# Patient Record
Sex: FEMALE | Race: WHITE | Employment: OTHER | URBAN - NONMETROPOLITAN AREA
[De-identification: names, ages, dates, MRNs, and addresses within clinical notes are randomized per-mention and may not be internally consistent; named-entity substitution may affect disease eponyms.]

---

## 2017-10-10 ENCOUNTER — HOSPITAL ENCOUNTER (OUTPATIENT)
Dept: NON INVASIVE DIAGNOSTICS | Age: 67
Discharge: HOME | End: 2017-10-10
Payer: MEDICARE

## 2017-10-10 VITALS — BODY MASS INDEX: 33.38 KG/M2 | HEIGHT: 60 IN | WEIGHT: 170 LBS

## 2017-10-10 PROCEDURE — 3430000000 HC RX DIAGNOSTIC RADIOPHARMACEUTICAL: Performed by: INTERNAL MEDICINE

## 2017-10-10 PROCEDURE — A9500 TC99M SESTAMIBI: HCPCS | Performed by: INTERNAL MEDICINE

## 2017-10-10 PROCEDURE — 93017 CV STRESS TEST TRACING ONLY: CPT | Performed by: INTERNAL MEDICINE

## 2017-10-10 PROCEDURE — 78452 HT MUSCLE IMAGE SPECT MULT: CPT

## 2017-10-10 RX ORDER — MONTELUKAST SODIUM 10 MG/1
10 TABLET ORAL NIGHTLY
COMMUNITY

## 2017-10-10 RX ADMIN — Medication 29.7 MILLICURIE: at 13:00

## 2017-10-10 RX ADMIN — Medication 9 MILLICURIE: at 11:57

## 2018-05-22 ENCOUNTER — OFFICE VISIT (OUTPATIENT)
Dept: ENT CLINIC | Age: 68
End: 2018-05-22
Payer: MEDICARE

## 2018-05-22 VITALS
HEIGHT: 60 IN | DIASTOLIC BLOOD PRESSURE: 70 MMHG | SYSTOLIC BLOOD PRESSURE: 124 MMHG | BODY MASS INDEX: 33.96 KG/M2 | WEIGHT: 173 LBS | TEMPERATURE: 97.7 F | RESPIRATION RATE: 12 BRPM | HEART RATE: 80 BPM

## 2018-05-22 DIAGNOSIS — K21.9 GASTROESOPHAGEAL REFLUX DISEASE WITHOUT ESOPHAGITIS: ICD-10-CM

## 2018-05-22 DIAGNOSIS — H92.01 RIGHT EAR PAIN: ICD-10-CM

## 2018-05-22 DIAGNOSIS — J32.2 CHRONIC ETHMOIDAL SINUSITIS: ICD-10-CM

## 2018-05-22 DIAGNOSIS — H91.93 BILATERAL HEARING LOSS, UNSPECIFIED HEARING LOSS TYPE: Primary | ICD-10-CM

## 2018-05-22 PROCEDURE — G8427 DOCREV CUR MEDS BY ELIG CLIN: HCPCS | Performed by: OTOLARYNGOLOGY

## 2018-05-22 PROCEDURE — 1090F PRES/ABSN URINE INCON ASSESS: CPT | Performed by: OTOLARYNGOLOGY

## 2018-05-22 PROCEDURE — 1036F TOBACCO NON-USER: CPT | Performed by: OTOLARYNGOLOGY

## 2018-05-22 PROCEDURE — 4040F PNEUMOC VAC/ADMIN/RCVD: CPT | Performed by: OTOLARYNGOLOGY

## 2018-05-22 PROCEDURE — 3017F COLORECTAL CA SCREEN DOC REV: CPT | Performed by: OTOLARYNGOLOGY

## 2018-05-22 PROCEDURE — G8400 PT W/DXA NO RESULTS DOC: HCPCS | Performed by: OTOLARYNGOLOGY

## 2018-05-22 PROCEDURE — 1123F ACP DISCUSS/DSCN MKR DOCD: CPT | Performed by: OTOLARYNGOLOGY

## 2018-05-22 PROCEDURE — 99203 OFFICE O/P NEW LOW 30 MIN: CPT | Performed by: OTOLARYNGOLOGY

## 2018-05-22 PROCEDURE — G8417 CALC BMI ABV UP PARAM F/U: HCPCS | Performed by: OTOLARYNGOLOGY

## 2018-05-22 PROCEDURE — 92504 EAR MICROSCOPY EXAMINATION: CPT | Performed by: OTOLARYNGOLOGY

## 2018-05-22 ASSESSMENT — ENCOUNTER SYMPTOMS
RHINORRHEA: 0
APNEA: 0
CHEST TIGHTNESS: 0
VOMITING: 0
TROUBLE SWALLOWING: 0
SORE THROAT: 0
FACIAL SWELLING: 0
WHEEZING: 1
NAUSEA: 0
VOICE CHANGE: 0
SINUS PRESSURE: 0
DIARRHEA: 0
CHOKING: 0
SHORTNESS OF BREATH: 1
COLOR CHANGE: 0
COUGH: 0
STRIDOR: 0
ABDOMINAL PAIN: 0

## 2018-06-26 ENCOUNTER — OFFICE VISIT (OUTPATIENT)
Dept: ENT CLINIC | Age: 68
End: 2018-06-26
Payer: MEDICARE

## 2018-06-26 ENCOUNTER — HOSPITAL ENCOUNTER (OUTPATIENT)
Dept: AUDIOLOGY | Age: 68
Discharge: HOME | End: 2018-06-26
Payer: MEDICARE

## 2018-06-26 VITALS
TEMPERATURE: 97.5 F | HEART RATE: 76 BPM | DIASTOLIC BLOOD PRESSURE: 70 MMHG | WEIGHT: 174 LBS | HEIGHT: 60 IN | BODY MASS INDEX: 34.16 KG/M2 | RESPIRATION RATE: 12 BRPM | SYSTOLIC BLOOD PRESSURE: 130 MMHG

## 2018-06-26 DIAGNOSIS — H90.3 SENSORINEURAL HEARING LOSS (SNHL) OF BOTH EARS: Primary | ICD-10-CM

## 2018-06-26 DIAGNOSIS — K21.9 GASTROESOPHAGEAL REFLUX DISEASE WITHOUT ESOPHAGITIS: ICD-10-CM

## 2018-06-26 PROCEDURE — 1101F PT FALLS ASSESS-DOCD LE1/YR: CPT | Performed by: OTOLARYNGOLOGY

## 2018-06-26 PROCEDURE — 92567 TYMPANOMETRY: CPT | Performed by: AUDIOLOGIST

## 2018-06-26 PROCEDURE — 4040F PNEUMOC VAC/ADMIN/RCVD: CPT | Performed by: OTOLARYNGOLOGY

## 2018-06-26 PROCEDURE — G8427 DOCREV CUR MEDS BY ELIG CLIN: HCPCS | Performed by: OTOLARYNGOLOGY

## 2018-06-26 PROCEDURE — 1123F ACP DISCUSS/DSCN MKR DOCD: CPT | Performed by: OTOLARYNGOLOGY

## 2018-06-26 PROCEDURE — 99213 OFFICE O/P EST LOW 20 MIN: CPT | Performed by: OTOLARYNGOLOGY

## 2018-06-26 PROCEDURE — 92557 COMPREHENSIVE HEARING TEST: CPT | Performed by: AUDIOLOGIST

## 2018-06-26 PROCEDURE — G8417 CALC BMI ABV UP PARAM F/U: HCPCS | Performed by: OTOLARYNGOLOGY

## 2018-06-26 PROCEDURE — 3017F COLORECTAL CA SCREEN DOC REV: CPT | Performed by: OTOLARYNGOLOGY

## 2018-06-26 PROCEDURE — 1036F TOBACCO NON-USER: CPT | Performed by: OTOLARYNGOLOGY

## 2018-06-26 PROCEDURE — G8400 PT W/DXA NO RESULTS DOC: HCPCS | Performed by: OTOLARYNGOLOGY

## 2018-06-26 PROCEDURE — 1090F PRES/ABSN URINE INCON ASSESS: CPT | Performed by: OTOLARYNGOLOGY

## 2018-06-26 ASSESSMENT — ENCOUNTER SYMPTOMS
SINUS PRESSURE: 0
FACIAL SWELLING: 0
ABDOMINAL PAIN: 0
RHINORRHEA: 0
STRIDOR: 0
APNEA: 0
VOMITING: 0
COUGH: 0
VOICE CHANGE: 0
NAUSEA: 0
WHEEZING: 0
TROUBLE SWALLOWING: 0
DIARRHEA: 0
CHEST TIGHTNESS: 0
COLOR CHANGE: 0
SHORTNESS OF BREATH: 0
SORE THROAT: 0
CHOKING: 0

## 2018-06-26 NOTE — PROGRESS NOTES
hydrochlorothiazide (HYDRODIURIL) 12.5 MG tablet Take 12.5 mg by mouth daily.  rosuvastatin (CRESTOR) 10 MG tablet Take 10 mg by mouth daily.  Multiple Vitamins-Minerals (CENTRUM SILVER) TABS Take  by mouth daily.  albuterol (PROAIR HFA) 108 (90 BASE) MCG/ACT inhaler Inhale 2 puffs into the lungs every 6 hours as needed. No current facility-administered medications for this visit. Past Medical History:   Diagnosis Date    Allergic rhinitis     Asthma     Hyperlipidemia     Hypertension     PONV (postoperative nausea and vomiting)     patch helps      Past Surgical History:   Procedure Laterality Date    ADENOIDECTOMY      BLADDER SUSPENSION  1984    BREAST SURGERY  july 2010    right- 2 cells benign     CHOLECYSTECTOMY  10/20/2011    COLONOSCOPY      HYSTERECTOMY  1984    total    TONSILLECTOMY AND ADENOIDECTOMY      UPPER GASTROINTESTINAL ENDOSCOPY       Family History   Problem Relation Age of Onset    High Blood Pressure Mother     Diabetes Mother     High Blood Pressure Father     Diabetes Father     Macular Degen Paternal Grandfather     Diabetes Sister     Cancer Brother     Diabetes Brother     Heart Disease Brother     High Blood Pressure Brother     Cancer Brother     Diabetes Brother     High Blood Pressure Brother     High Blood Pressure Brother     Stroke Neg Hx      Social History   Substance Use Topics    Smoking status: Former Smoker     Quit date: 6/29/2001    Smokeless tobacco: Never Used    Alcohol use No       Subjective:      Review of Systems   Constitutional: Negative for activity change, appetite change, chills, diaphoresis, fatigue, fever and unexpected weight change. HENT: Negative for congestion, dental problem, ear discharge, ear pain, facial swelling, hearing loss, mouth sores, nosebleeds, postnasal drip, rhinorrhea, sinus pressure, sneezing, sore throat, tinnitus, trouble swallowing and voice change.     Eyes: Negative for

## 2019-05-14 ENCOUNTER — HOSPITAL ENCOUNTER (EMERGENCY)
Age: 69
Discharge: HOME | End: 2019-05-14
Attending: EMERGENCY MEDICINE
Payer: MEDICARE

## 2019-05-14 VITALS
DIASTOLIC BLOOD PRESSURE: 62 MMHG | SYSTOLIC BLOOD PRESSURE: 131 MMHG | RESPIRATION RATE: 20 BRPM | OXYGEN SATURATION: 94 % | TEMPERATURE: 98.4 F | BODY MASS INDEX: 32.77 KG/M2 | HEART RATE: 78 BPM | WEIGHT: 165 LBS

## 2019-05-14 DIAGNOSIS — J01.01 ACUTE RECURRENT MAXILLARY SINUSITIS: Primary | ICD-10-CM

## 2019-05-14 DIAGNOSIS — J45.41 MODERATE PERSISTENT ASTHMA WITH ACUTE EXACERBATION: ICD-10-CM

## 2019-05-14 DIAGNOSIS — J20.9 ACUTE BRONCHITIS DUE TO INFECTION: ICD-10-CM

## 2019-05-14 PROCEDURE — 99214 OFFICE O/P EST MOD 30 MIN: CPT | Performed by: EMERGENCY MEDICINE

## 2019-05-14 PROCEDURE — 99212 OFFICE O/P EST SF 10 MIN: CPT

## 2019-05-14 RX ORDER — PREDNISONE 20 MG/1
20 TABLET ORAL DAILY
Qty: 7 TABLET | Refills: 0 | Status: SHIPPED | OUTPATIENT
Start: 2019-05-14 | End: 2019-05-21

## 2019-05-14 RX ORDER — AMOXICILLIN AND CLAVULANATE POTASSIUM 875; 125 MG/1; MG/1
1 TABLET, FILM COATED ORAL 2 TIMES DAILY
Qty: 14 TABLET | Refills: 0 | Status: SHIPPED | OUTPATIENT
Start: 2019-05-14 | End: 2019-05-21

## 2019-05-14 RX ORDER — BENZONATATE 200 MG/1
200 CAPSULE ORAL 3 TIMES DAILY PRN
Qty: 10 CAPSULE | Refills: 0 | Status: SHIPPED | OUTPATIENT
Start: 2019-05-14 | End: 2019-05-29

## 2019-05-14 ASSESSMENT — ENCOUNTER SYMPTOMS
CHOKING: 0
CONSTIPATION: 0
EYE PAIN: 0
NAUSEA: 0
EYE DISCHARGE: 0
STRIDOR: 0
EYE REDNESS: 0
ABDOMINAL PAIN: 0
COUGH: 1
VOICE CHANGE: 0
SINUS PAIN: 1
SINUS PRESSURE: 1
SHORTNESS OF BREATH: 0
DIARRHEA: 0
SORE THROAT: 1
TROUBLE SWALLOWING: 0
BLOOD IN STOOL: 0
BACK PAIN: 0
WHEEZING: 1
RHINORRHEA: 1
VOMITING: 0
FACIAL SWELLING: 0

## 2019-05-14 ASSESSMENT — PAIN DESCRIPTION - DESCRIPTORS: DESCRIPTORS: ACHING

## 2019-05-14 ASSESSMENT — PAIN SCALES - GENERAL: PAINLEVEL_OUTOF10: 5

## 2019-05-14 ASSESSMENT — PAIN DESCRIPTION - LOCATION: LOCATION: HEAD

## 2019-05-14 ASSESSMENT — PAIN DESCRIPTION - PAIN TYPE: TYPE: ACUTE PAIN

## 2019-05-14 ASSESSMENT — PAIN DESCRIPTION - FREQUENCY: FREQUENCY: CONTINUOUS

## 2019-05-14 NOTE — ED TRIAGE NOTES
Pt walked to room 3. Pt here with complaints of a productive cough, chest tightness, nasal drainage--green, shortness of breath. Started 3 days ago.

## 2019-05-14 NOTE — ED PROVIDER NOTES
[address]       Chief Complaint   Patient presents with    Cough     Productive cough, green nasal drainage, headache. Started 3 days ago. Nurses Notes reviewed and I agree except as noted in the HPI. HISTORY OF PRESENT ILLNESS   Vipul Gee is a 70 y.o. female who presents with three-day history of maxillary sinus pain and pressure, cough productive of yellow sputum, fatigue, sore throat, malaise, wheezing. Patient states symptoms are identical to previous bronchitis and sinusitis. No chest pain, stridor, abdominal pain, vomiting, hemoptysis, leg pain or leg swelling, rash,  symptoms, diarrhea. History of asthma no DM, CHF or PE.    REVIEW OF SYSTEMS     Review of Systems   Constitutional: Positive for appetite change. Negative for chills, fatigue, fever and unexpected weight change. HENT: Positive for congestion, postnasal drip, rhinorrhea, sinus pressure, sinus pain and sore throat. Negative for ear discharge, ear pain, facial swelling, hearing loss, nosebleeds, trouble swallowing and voice change. Eyes: Negative for pain, discharge, redness and visual disturbance. Respiratory: Positive for cough and wheezing. Negative for choking, shortness of breath and stridor. Cardiovascular: Negative for chest pain and leg swelling. Gastrointestinal: Negative for abdominal pain, blood in stool, constipation, diarrhea, nausea and vomiting. Genitourinary: Negative for dysuria, flank pain, frequency, hematuria, urgency, vaginal bleeding and vaginal discharge. Musculoskeletal: Negative for arthralgias, back pain, neck pain and neck stiffness. Skin: Negative for rash. Neurological: Negative for dizziness, seizures, syncope, weakness, light-headedness and headaches. Hematological: Negative for adenopathy. Does not bruise/bleed easily. Psychiatric/Behavioral: Negative for confusion, sleep disturbance and suicidal ideas.  The patient is not nervous/anxious. All other systems reviewed and are negative. PAST MEDICAL HISTORY         Diagnosis Date    Allergic rhinitis     Asthma     Hyperlipidemia     Hypertension     PONV (postoperative nausea and vomiting)     patch helps       SURGICAL HISTORY     Patient  has a past surgical history that includes Cholecystectomy (10/20/2011); bladder suspension (1984); Hysterectomy (1984); Tonsillectomy and adenoidectomy; Adenoidectomy; Breast surgery (july 2010); Colonoscopy; and Upper gastrointestinal endoscopy. CURRENT MEDICATIONS       Previous Medications    ALBUTEROL (PROAIR HFA) 108 (90 BASE) MCG/ACT INHALER    Inhale 2 puffs into the lungs every 6 hours as needed. ATENOLOL (TENORMIN) 50 MG TABLET    Take 50 mg by mouth daily. CETIRIZINE (ZYRTEC) 10 MG TABLET    Take 10 mg by mouth daily    DEXLANSOPRAZOLE (DEXILANT) 60 MG CPDR DELAYED RELEASE CAPSULE    Take 60 mg by mouth as needed    HYDROCHLOROTHIAZIDE (HYDRODIURIL) 12.5 MG TABLET    Take 12.5 mg by mouth daily. MOMETASONE-FORMOTEROL (DULERA) 100-5 MCG/ACT INHALER    Inhale 2 puffs into the lungs 2 times daily    MONTELUKAST (SINGULAIR) 10 MG TABLET    Take 10 mg by mouth nightly    MULTIPLE VITAMINS-MINERALS (CENTRUM SILVER) TABS    Take  by mouth daily. ROSUVASTATIN (CRESTOR) 10 MG TABLET    Take 10 mg by mouth daily. TRIAMCINOLONE ACETONIDE (NASACORT AQ NA)    by Nasal route as needed       ALLERGIES     Patient is has No Known Allergies. FAMILY HISTORY     Patient'sfamily history includes Cancer in her brother and brother; Diabetes in her brother, brother, father, mother, and sister; Heart Disease in her brother; High Blood Pressure in her brother, brother, brother, father, and mother; [place] in her paternal grandfather. SOCIAL HISTORY     Patient  reports that she quit smoking about 17 years ago.  She has never used smokeless tobacco. She reports that she does not drink alcohol or use drugs.    PHYSICAL EXAM     ED TRIAGE VITALS  BP: 131/62, Temp: 98.4 °F (36.9 °C), Pulse: 78, Resp: 20, SpO2: 94 %  Physical Exam   Constitutional: She is oriented to person, place, and time. She appears well-developed and well-nourished. No distress. Dry cough, moist membranes, normal airway   HENT:   Head: Normocephalic and atraumatic. Right Ear: Tympanic membrane and external ear normal.   Left Ear: Tympanic membrane and external ear normal.   Nose: No rhinorrhea. Right sinus exhibits maxillary sinus tenderness and frontal sinus tenderness. Left sinus exhibits maxillary sinus tenderness and frontal sinus tenderness. Mouth/Throat: No trismus in the jaw. No uvula swelling. Posterior oropharyngeal erythema present. No oropharyngeal exudate, posterior oropharyngeal edema or tonsillar abscesses. Eyes: Pupils are equal, round, and reactive to light. Conjunctivae and EOM are normal. Right eye exhibits no discharge. Left eye exhibits no discharge. No scleral icterus. Neck: Normal range of motion. No JVD present. No thyromegaly present. No meningismus   Cardiovascular: Normal rate, regular rhythm, S1 normal, S2 normal, normal heart sounds, intact distal pulses and normal pulses. Exam reveals no gallop and no friction rub. No murmur heard. Pulmonary/Chest: Effort normal. No stridor. No tachypnea. No respiratory distress. She has no decreased breath sounds. She has no wheezes. She has rhonchi. She has no rales. She exhibits no tenderness. Dry cough, diffuse rhonchi, no wheezing   Abdominal: Soft. Bowel sounds are normal. She exhibits no distension and no mass. There is no tenderness. There is no rebound and no guarding. Soft nontender   Musculoskeletal: Normal range of motion. She exhibits no edema or tenderness. Right lower leg: Normal.        Left lower leg: Normal.   Lymphadenopathy:     She has cervical adenopathy. Right cervical: Superficial cervical adenopathy present.  No deep cervical and no posterior cervical adenopathy present. Left cervical: Superficial cervical adenopathy present. No deep cervical and no posterior cervical adenopathy present. Neurological: She is alert and oriented to person, place, and time. She has normal reflexes. She displays normal reflexes. No cranial nerve deficit. She exhibits normal muscle tone. Coordination normal.   Appropriate, no focal findings   Skin: Skin is warm and dry. No rash noted. She is not diaphoretic. No erythema. No rash   Psychiatric: She has a normal mood and affect. Her behavior is normal. Judgment and thought content normal.   Nursing note and vitals reviewed. DIAGNOSTIC RESULTS   Labs: No results found for this visit on 05/14/19. IMAGING:  No orders to display     URGENT CARE COURSE:     Vitals:    05/14/19 1909   BP: 131/62   Pulse: 78   Resp: 20   Temp: 98.4 °F (36.9 °C)   TempSrc: Temporal   SpO2: 94%   Weight: 165 lb (74.8 kg)       Medications - No data to display  PROCEDURES:  None  FINALIMPRESSION      1. Acute recurrent maxillary sinusitis    2. Acute bronchitis due to infection    3. Moderate persistent asthma with acute exacerbation        DISPOSITION/PLAN   DISPOSITION Decision To Discharge 05/14/2019 07:47:09 PM  Nontoxic, well-hydrated, normal airway. No airway abscess or epiglottitis, sepsis, CNS infection, pneumonia, hypoxia, bronchospasm. No ACS, CHF, PE. Patient has acute sinusitis and bronchitis. She has exacerbation of asthma. Will treat with Augmentin, prednisone, albuterol, Tessalon Perles, Tylenol, increased oral clear liquids, rest.  Patient to recheck with PCP in 3 days if problems persist, and she understands to go to ED if worse.   PATIENT REFERRED TO:  Tammy Mathis MD  [place] [id number]  [phone]    Schedule an appointment as soon as possible for a visit in 3 days  Recheck if problems persist, go to emergency if worse    DISCHARGE MEDICATIONS:  New Prescriptions AMOXICILLIN-CLAVULANATE (AUGMENTIN) 875-125 MG PER TABLET    Take 1 tablet by mouth 2 times daily for 7 days    BENZONATATE (TESSALON) 200 MG CAPSULE    Take 1 capsule by mouth 3 times daily as needed for Cough    PREDNISONE (DELTASONE) 20 MG TABLET    Take 1 tablet by mouth daily for 7 days     Current Discharge Medication List          MD Christopher Varela MD  05/14/19 [address], [place]  05/15/19 4146

## 2019-05-15 ASSESSMENT — ENCOUNTER SYMPTOMS
VOMITING: 0
SINUS PRESSURE: 1
FACIAL SWELLING: 0
CHOKING: 0
CONSTIPATION: 0
SHORTNESS OF BREATH: 0
NAUSEA: 0
BLOOD IN STOOL: 0
EYE DISCHARGE: 0
TROUBLE SWALLOWING: 0
STRIDOR: 0
VOICE CHANGE: 0
DIARRHEA: 0
EYE REDNESS: 0
SORE THROAT: 1
EYE PAIN: 0
BACK PAIN: 0
ABDOMINAL PAIN: 0
COUGH: 1
WHEEZING: 1
RHINORRHEA: 1
SINUS PAIN: 1

## 2019-12-19 ENCOUNTER — HOSPITAL ENCOUNTER (EMERGENCY)
Age: 69
Discharge: HOME | End: 2019-12-19
Payer: MEDICARE

## 2019-12-19 ENCOUNTER — APPOINTMENT (OUTPATIENT)
Dept: GENERAL RADIOLOGY | Age: 69
End: 2019-12-19
Payer: MEDICARE

## 2019-12-19 ENCOUNTER — APPOINTMENT (OUTPATIENT)
Dept: CT IMAGING | Age: 69
End: 2019-12-19
Payer: MEDICARE

## 2019-12-19 ENCOUNTER — APPOINTMENT (OUTPATIENT)
Dept: MRI IMAGING | Age: 69
End: 2019-12-19
Payer: MEDICARE

## 2019-12-19 VITALS
HEART RATE: 77 BPM | OXYGEN SATURATION: 98 % | RESPIRATION RATE: 16 BRPM | SYSTOLIC BLOOD PRESSURE: 167 MMHG | TEMPERATURE: 98.3 F | DIASTOLIC BLOOD PRESSURE: 95 MMHG

## 2019-12-19 DIAGNOSIS — S63.681A OTHER SPRAIN OF RIGHT THUMB, INITIAL ENCOUNTER: ICD-10-CM

## 2019-12-19 DIAGNOSIS — S01.01XA LACERATION OF SCALP, INITIAL ENCOUNTER: ICD-10-CM

## 2019-12-19 DIAGNOSIS — S29.012A STRAIN OF THORACIC BACK REGION: ICD-10-CM

## 2019-12-19 DIAGNOSIS — S09.90XA CLOSED HEAD INJURY, INITIAL ENCOUNTER: Primary | ICD-10-CM

## 2019-12-19 PROCEDURE — 90471 IMMUNIZATION ADMIN: CPT | Performed by: EMERGENCY MEDICINE

## 2019-12-19 PROCEDURE — 6360000004 HC RX CONTRAST MEDICATION: Performed by: EMERGENCY MEDICINE

## 2019-12-19 PROCEDURE — 6360000002 HC RX W HCPCS: Performed by: EMERGENCY MEDICINE

## 2019-12-19 PROCEDURE — 72125 CT NECK SPINE W/O DYE: CPT

## 2019-12-19 PROCEDURE — 72072 X-RAY EXAM THORAC SPINE 3VWS: CPT

## 2019-12-19 PROCEDURE — 99284 EMERGENCY DEPT VISIT MOD MDM: CPT

## 2019-12-19 PROCEDURE — 12035 INTMD RPR S/A/T/EXT 12.6-20: CPT

## 2019-12-19 PROCEDURE — A9579 GAD-BASE MR CONTRAST NOS,1ML: HCPCS | Performed by: EMERGENCY MEDICINE

## 2019-12-19 PROCEDURE — 2500000003 HC RX 250 WO HCPCS: Performed by: EMERGENCY MEDICINE

## 2019-12-19 PROCEDURE — 70450 CT HEAD/BRAIN W/O DYE: CPT

## 2019-12-19 PROCEDURE — 70553 MRI BRAIN STEM W/O & W/DYE: CPT

## 2019-12-19 PROCEDURE — 96374 THER/PROPH/DIAG INJ IV PUSH: CPT

## 2019-12-19 PROCEDURE — 2580000003 HC RX 258: Performed by: EMERGENCY MEDICINE

## 2019-12-19 PROCEDURE — 73130 X-RAY EXAM OF HAND: CPT

## 2019-12-19 PROCEDURE — 90715 TDAP VACCINE 7 YRS/> IM: CPT | Performed by: EMERGENCY MEDICINE

## 2019-12-19 PROCEDURE — 96375 TX/PRO/DX INJ NEW DRUG ADDON: CPT

## 2019-12-19 RX ORDER — ONDANSETRON 2 MG/ML
4 INJECTION INTRAMUSCULAR; INTRAVENOUS ONCE
Status: COMPLETED | OUTPATIENT
Start: 2019-12-19 | End: 2019-12-19

## 2019-12-19 RX ORDER — MORPHINE SULFATE 2 MG/ML
2 INJECTION, SOLUTION INTRAMUSCULAR; INTRAVENOUS ONCE
Status: COMPLETED | OUTPATIENT
Start: 2019-12-19 | End: 2019-12-19

## 2019-12-19 RX ORDER — CEPHALEXIN 500 MG/1
500 CAPSULE ORAL 3 TIMES DAILY
Qty: 30 CAPSULE | Refills: 0 | Status: SHIPPED | OUTPATIENT
Start: 2019-12-19 | End: 2019-12-29

## 2019-12-19 RX ORDER — LIDOCAINE HYDROCHLORIDE AND EPINEPHRINE 10; 10 MG/ML; UG/ML
20 INJECTION, SOLUTION INFILTRATION; PERINEURAL ONCE
Status: COMPLETED | OUTPATIENT
Start: 2019-12-19 | End: 2019-12-19

## 2019-12-19 RX ADMIN — GADOTERIDOL 15 ML: 279.3 INJECTION, SOLUTION INTRAVENOUS at 13:58

## 2019-12-19 RX ADMIN — LIDOCAINE HYDROCHLORIDE AND EPINEPHRINE 20 ML: 10; 10 INJECTION, SOLUTION INFILTRATION; PERINEURAL at 11:00

## 2019-12-19 RX ADMIN — TETANUS TOXOID, REDUCED DIPHTHERIA TOXOID AND ACELLULAR PERTUSSIS VACCINE, ADSORBED 0.5 ML: 5; 2.5; 8; 8; 2.5 SUSPENSION INTRAMUSCULAR at 10:50

## 2019-12-19 RX ADMIN — MORPHINE SULFATE 2 MG: 2 INJECTION, SOLUTION INTRAMUSCULAR; INTRAVENOUS at 12:23

## 2019-12-19 RX ADMIN — ONDANSETRON 4 MG: 2 INJECTION INTRAMUSCULAR; INTRAVENOUS at 12:23

## 2019-12-19 RX ADMIN — CEFAZOLIN 1 G: 1 INJECTION, POWDER, FOR SOLUTION INTRAMUSCULAR; INTRAVENOUS at 14:55

## 2019-12-19 ASSESSMENT — PAIN SCALES - GENERAL
PAINLEVEL_OUTOF10: 0
PAINLEVEL_OUTOF10: 6
PAINLEVEL_OUTOF10: 7

## 2019-12-19 ASSESSMENT — ENCOUNTER SYMPTOMS
COUGH: 0
COLOR CHANGE: 0
SHORTNESS OF BREATH: 0
BACK PAIN: 1

## 2019-12-19 ASSESSMENT — PAIN DESCRIPTION - PAIN TYPE: TYPE: DEEP SOMATIC PAIN

## 2019-12-27 ENCOUNTER — OFFICE VISIT (OUTPATIENT)
Dept: SURGERY | Age: 69
End: 2019-12-27
Payer: MEDICARE

## 2019-12-27 VITALS
SYSTOLIC BLOOD PRESSURE: 124 MMHG | OXYGEN SATURATION: 96 % | RESPIRATION RATE: 20 BRPM | TEMPERATURE: 98 F | WEIGHT: 166 LBS | DIASTOLIC BLOOD PRESSURE: 80 MMHG | HEART RATE: 72 BPM | BODY MASS INDEX: 32.97 KG/M2
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
## 2019-12-27 DIAGNOSIS — S01.01XA SCALP LACERATION, INITIAL ENCOUNTER: ICD-10-CM

## 2019-12-27 PROCEDURE — G8427 DOCREV CUR MEDS BY ELIG CLIN: HCPCS | Performed by: SURGERY

## 2019-12-27 PROCEDURE — 1090F PRES/ABSN URINE INCON ASSESS: CPT | Performed by: SURGERY

## 2019-12-27 PROCEDURE — G8417 CALC BMI ABV UP PARAM F/U: HCPCS | Performed by: SURGERY

## 2019-12-27 PROCEDURE — G8484 FLU IMMUNIZE NO ADMIN: HCPCS | Performed by: SURGERY

## 2019-12-27 PROCEDURE — 99203 OFFICE O/P NEW LOW 30 MIN: CPT | Performed by: SURGERY

## 2019-12-27 RX ORDER — IBUPROFEN 200 MG
200 TABLET ORAL EVERY 6 HOURS PRN
COMMUNITY

## 2019-12-29 ASSESSMENT — ENCOUNTER SYMPTOMS
BLOOD IN STOOL: 0
FACIAL SWELLING: 0
NAUSEA: 0
ABDOMINAL PAIN: 0
WHEEZING: 0
CONSTIPATION: 0
STRIDOR: 0
DIARRHEA: 0
TROUBLE SWALLOWING: 0
SINUS PRESSURE: 0
ANAL BLEEDING: 0
SHORTNESS OF BREATH: 0
RECTAL PAIN: 0
EYE ITCHING: 0
EYE REDNESS: 0
VOMITING: 0
BACK PAIN: 0
APNEA: 0
VOICE CHANGE: 0
CHOKING: 0
EYE PAIN: 0
SORE THROAT: 0
COLOR CHANGE: 0
ALLERGIC/IMMUNOLOGIC NEGATIVE: 1
PHOTOPHOBIA: 0
CHEST TIGHTNESS: 0
RHINORRHEA: 0
EYE DISCHARGE: 0
COUGH: 0
ABDOMINAL DISTENTION: 0

## 2020-02-03 ENCOUNTER — HOSPITAL ENCOUNTER (OUTPATIENT)
Dept: MRI IMAGING | Age: 70
Discharge: HOME | End: 2020-02-03
Payer: MEDICARE

## 2020-02-03 LAB — POC CREATININE WHOLE BLOOD: 0.8 MG/DL (ref 0.5–1.2)

## 2020-02-03 PROCEDURE — 70553 MRI BRAIN STEM W/O & W/DYE: CPT

## 2020-02-03 PROCEDURE — 82565 ASSAY OF CREATININE: CPT

## 2020-02-03 PROCEDURE — A9579 GAD-BASE MR CONTRAST NOS,1ML: HCPCS | Performed by: FAMILY MEDICINE

## 2020-02-03 PROCEDURE — 6360000004 HC RX CONTRAST MEDICATION: Performed by: FAMILY MEDICINE

## 2020-02-03 RX ADMIN — GADOTERIDOL 20 ML: 279.3 INJECTION, SOLUTION INTRAVENOUS at 16:56

## 2020-10-29 LAB — SARS-COV-2: DETECTED

## 2020-11-03 ENCOUNTER — APPOINTMENT (OUTPATIENT)
Dept: GENERAL RADIOLOGY | Age: 70
DRG: 177 | End: 2020-11-03
Payer: MEDICARE

## 2020-11-03 ENCOUNTER — HOSPITAL ENCOUNTER (INPATIENT)
Age: 70
LOS: 3 days | Discharge: HOME | DRG: 177 | End: 2020-11-06
Attending: INTERNAL MEDICINE | Admitting: INTERNAL MEDICINE
Payer: MEDICARE

## 2020-11-03 PROBLEM — U07.1 ACUTE RESPIRATORY FAILURE DUE TO COVID-19 (HCC): Status: ACTIVE | Noted: 2020-11-03

## 2020-11-03 PROBLEM — J96.00 ACUTE RESPIRATORY FAILURE DUE TO COVID-19 (HCC): Status: ACTIVE | Noted: 2020-11-03

## 2020-11-03 LAB
ALBUMIN SERPL-MCNC: 4.1 G/DL (ref 3.5–5.1)
ALP BLD-CCNC: 78 U/L (ref 38–126)
ALT SERPL-CCNC: 55 U/L (ref 11–66)
ANION GAP SERPL CALCULATED.3IONS-SCNC: 14 MEQ/L (ref 8–16)
AST SERPL-CCNC: 71 U/L (ref 5–40)
BASOPHILS # BLD: 0.2 %
BASOPHILS ABSOLUTE: 0 THOU/MM3 (ref 0–0.1)
BILIRUB SERPL-MCNC: 0.7 MG/DL (ref 0.3–1.2)
BUN BLDV-MCNC: 18 MG/DL (ref 7–22)
C-REACTIVE PROTEIN: 5.23 MG/DL (ref 0–1)
CALCIUM SERPL-MCNC: 9.6 MG/DL (ref 8.5–10.5)
CHLORIDE BLD-SCNC: 88 MEQ/L (ref 98–111)
CO2: 29 MEQ/L (ref 23–33)
CREAT SERPL-MCNC: 0.8 MG/DL (ref 0.4–1.2)
D-DIMER QUANTITATIVE: 627 NG/ML FEU (ref 0–500)
EKG ATRIAL RATE: 82 BPM
EKG P AXIS: 61 DEGREES
EKG P-R INTERVAL: 144 MS
EKG Q-T INTERVAL: 370 MS
EKG QRS DURATION: 92 MS
EKG QTC CALCULATION (BAZETT): 432 MS
EKG R AXIS: 68 DEGREES
EKG T AXIS: 56 DEGREES
EKG VENTRICULAR RATE: 82 BPM
EOSINOPHIL # BLD: 0 %
EOSINOPHILS ABSOLUTE: 0 THOU/MM3 (ref 0–0.4)
ERYTHROCYTE [DISTWIDTH] IN BLOOD BY AUTOMATED COUNT: 12.2 % (ref 11.5–14.5)
ERYTHROCYTE [DISTWIDTH] IN BLOOD BY AUTOMATED COUNT: 40.7 FL (ref 35–45)
FERRITIN: 711 NG/ML (ref 10–291)
GFR SERPL CREATININE-BSD FRML MDRD: 71 ML/MIN/1.73M2
GLUCOSE BLD-MCNC: 151 MG/DL (ref 70–108)
HCT VFR BLD CALC: 45.3 % (ref 37–47)
HEMOGLOBIN: 15.8 GM/DL (ref 12–16)
IMMATURE GRANS (ABS): 0.02 THOU/MM3 (ref 0–0.07)
IMMATURE GRANULOCYTES: 0.4 %
LACTIC ACID, SEPSIS: 1.4 MMOL/L (ref 0.5–1.9)
LD: 328 U/L (ref 100–190)
LYMPHOCYTES # BLD: 16 %
LYMPHOCYTES ABSOLUTE: 0.7 THOU/MM3 (ref 1–4.8)
MCH RBC QN AUTO: 31.7 PG (ref 26–33)
MCHC RBC AUTO-ENTMCNC: 34.9 GM/DL (ref 32.2–35.5)
MCV RBC AUTO: 90.8 FL (ref 81–99)
MONOCYTES # BLD: 8.2 %
MONOCYTES ABSOLUTE: 0.4 THOU/MM3 (ref 0.4–1.3)
NUCLEATED RED BLOOD CELLS: 0 /100 WBC
OSMOLALITY CALCULATION: 267.5 MOSMOL/KG (ref 275–300)
PLATELET # BLD: 161 THOU/MM3 (ref 130–400)
PMV BLD AUTO: 10.2 FL (ref 9.4–12.4)
POTASSIUM SERPL-SCNC: 2.9 MEQ/L (ref 3.5–5.2)
PROCALCITONIN: 0.24 NG/ML (ref 0.01–0.09)
RBC # BLD: 4.99 MILL/MM3 (ref 4.2–5.4)
SEG NEUTROPHILS: 75.2 %
SEGMENTED NEUTROPHILS ABSOLUTE COUNT: 3.5 THOU/MM3 (ref 1.8–7.7)
SODIUM BLD-SCNC: 131 MEQ/L (ref 135–145)
TOTAL PROTEIN: 7.4 G/DL (ref 6.1–8)
TROPONIN T: < 0.01 NG/ML
WBC # BLD: 4.6 THOU/MM3 (ref 4.8–10.8)

## 2020-11-03 PROCEDURE — 87040 BLOOD CULTURE FOR BACTERIA: CPT

## 2020-11-03 PROCEDURE — 86901 BLOOD TYPING SEROLOGIC RH(D): CPT

## 2020-11-03 PROCEDURE — 86140 C-REACTIVE PROTEIN: CPT

## 2020-11-03 PROCEDURE — 83605 ASSAY OF LACTIC ACID: CPT

## 2020-11-03 PROCEDURE — 80053 COMPREHEN METABOLIC PANEL: CPT

## 2020-11-03 PROCEDURE — 86078 PHYS BLOOD BANK SERV REACTJ: CPT

## 2020-11-03 PROCEDURE — 1200000003 HC TELEMETRY R&B

## 2020-11-03 PROCEDURE — P9059 PLASMA, FRZ BETWEEN 8-24HOUR: HCPCS

## 2020-11-03 PROCEDURE — XW13325 TRANSFUSION OF CONVALESCENT PLASMA (NONAUTOLOGOUS) INTO PERIPHERAL VEIN, PERCUTANEOUS APPROACH, NEW TECHNOLOGY GROUP 5: ICD-10-PCS | Performed by: FAMILY MEDICINE

## 2020-11-03 PROCEDURE — 86900 BLOOD TYPING SEROLOGIC ABO: CPT

## 2020-11-03 PROCEDURE — 85025 COMPLETE CBC W/AUTO DIFF WBC: CPT

## 2020-11-03 PROCEDURE — 84145 PROCALCITONIN (PCT): CPT

## 2020-11-03 PROCEDURE — 93005 ELECTROCARDIOGRAM TRACING: CPT | Performed by: PHYSICIAN ASSISTANT

## 2020-11-03 PROCEDURE — 6370000000 HC RX 637 (ALT 250 FOR IP): Performed by: PHYSICIAN ASSISTANT

## 2020-11-03 PROCEDURE — 82728 ASSAY OF FERRITIN: CPT

## 2020-11-03 PROCEDURE — 93010 ELECTROCARDIOGRAM REPORT: CPT | Performed by: INTERNAL MEDICINE

## 2020-11-03 PROCEDURE — 99222 1ST HOSP IP/OBS MODERATE 55: CPT | Performed by: PHYSICIAN ASSISTANT

## 2020-11-03 PROCEDURE — 85379 FIBRIN DEGRADATION QUANT: CPT

## 2020-11-03 PROCEDURE — XW033E5 INTRODUCTION OF REMDESIVIR ANTI-INFECTIVE INTO PERIPHERAL VEIN, PERCUTANEOUS APPROACH, NEW TECHNOLOGY GROUP 5: ICD-10-PCS | Performed by: FAMILY MEDICINE

## 2020-11-03 PROCEDURE — 84484 ASSAY OF TROPONIN QUANT: CPT

## 2020-11-03 PROCEDURE — 71045 X-RAY EXAM CHEST 1 VIEW: CPT

## 2020-11-03 PROCEDURE — 36415 COLL VENOUS BLD VENIPUNCTURE: CPT

## 2020-11-03 PROCEDURE — 99284 EMERGENCY DEPT VISIT MOD MDM: CPT

## 2020-11-03 PROCEDURE — 6360000002 HC RX W HCPCS: Performed by: PHYSICIAN ASSISTANT

## 2020-11-03 PROCEDURE — 83615 LACTATE (LD) (LDH) ENZYME: CPT

## 2020-11-03 RX ORDER — CETIRIZINE HYDROCHLORIDE 10 MG/1
10 TABLET ORAL DAILY
Status: DISCONTINUED | OUTPATIENT
Start: 2020-11-04 | End: 2020-11-06 | Stop reason: HOSPADM

## 2020-11-03 RX ORDER — VITAMIN B COMPLEX
1000 TABLET ORAL DAILY
Status: DISCONTINUED | OUTPATIENT
Start: 2020-11-04 | End: 2020-11-06 | Stop reason: HOSPADM

## 2020-11-03 RX ORDER — HYDROCHLOROTHIAZIDE 25 MG/1
12.5 TABLET ORAL DAILY
Status: DISCONTINUED | OUTPATIENT
Start: 2020-11-04 | End: 2020-11-06 | Stop reason: HOSPADM

## 2020-11-03 RX ORDER — ATENOLOL 50 MG/1
50 TABLET ORAL DAILY
Status: DISCONTINUED | OUTPATIENT
Start: 2020-11-04 | End: 2020-11-06 | Stop reason: HOSPADM

## 2020-11-03 RX ORDER — ROSUVASTATIN CALCIUM 10 MG/1
10 TABLET, COATED ORAL DAILY
Status: DISCONTINUED | OUTPATIENT
Start: 2020-11-04 | End: 2020-11-06 | Stop reason: HOSPADM

## 2020-11-03 RX ORDER — MONTELUKAST SODIUM 10 MG/1
10 TABLET ORAL NIGHTLY
Status: DISCONTINUED | OUTPATIENT
Start: 2020-11-03 | End: 2020-11-06 | Stop reason: HOSPADM

## 2020-11-03 RX ORDER — ASPIRIN 81 MG/1
81 TABLET, CHEWABLE ORAL DAILY
Status: DISCONTINUED | OUTPATIENT
Start: 2020-11-04 | End: 2020-11-06 | Stop reason: HOSPADM

## 2020-11-03 RX ORDER — POTASSIUM CHLORIDE 750 MG/1
40 TABLET, FILM COATED, EXTENDED RELEASE ORAL ONCE
Status: COMPLETED | OUTPATIENT
Start: 2020-11-03 | End: 2020-11-03

## 2020-11-03 RX ORDER — ALBUTEROL SULFATE 90 UG/1
6 AEROSOL, METERED RESPIRATORY (INHALATION) EVERY 6 HOURS PRN
Status: DISCONTINUED | OUTPATIENT
Start: 2020-11-03 | End: 2020-11-06 | Stop reason: HOSPADM

## 2020-11-03 RX ORDER — BUDESONIDE AND FORMOTEROL FUMARATE DIHYDRATE 160; 4.5 UG/1; UG/1
2 AEROSOL RESPIRATORY (INHALATION) 2 TIMES DAILY
Status: DISCONTINUED | OUTPATIENT
Start: 2020-11-04 | End: 2020-11-06 | Stop reason: HOSPADM

## 2020-11-03 RX ORDER — ZINC SULFATE 50(220)MG
50 CAPSULE ORAL DAILY
Status: DISCONTINUED | OUTPATIENT
Start: 2020-11-04 | End: 2020-11-06 | Stop reason: HOSPADM

## 2020-11-03 RX ORDER — ASCORBIC ACID 500 MG
500 TABLET ORAL DAILY
Status: DISCONTINUED | OUTPATIENT
Start: 2020-11-04 | End: 2020-11-06 | Stop reason: HOSPADM

## 2020-11-03 RX ORDER — DEXAMETHASONE SODIUM PHOSPHATE 4 MG/ML
6 INJECTION, SOLUTION INTRA-ARTICULAR; INTRALESIONAL; INTRAMUSCULAR; INTRAVENOUS; SOFT TISSUE DAILY
Status: DISCONTINUED | OUTPATIENT
Start: 2020-11-04 | End: 2020-11-06 | Stop reason: HOSPADM

## 2020-11-03 RX ORDER — 0.9 % SODIUM CHLORIDE 0.9 %
30 INTRAVENOUS SOLUTION INTRAVENOUS PRN
Status: DISCONTINUED | OUTPATIENT
Start: 2020-11-03 | End: 2020-11-06 | Stop reason: HOSPADM

## 2020-11-03 RX ORDER — 0.9 % SODIUM CHLORIDE 0.9 %
20 INTRAVENOUS SOLUTION INTRAVENOUS ONCE
Status: COMPLETED | OUTPATIENT
Start: 2020-11-03 | End: 2020-11-04

## 2020-11-03 RX ORDER — POTASSIUM CHLORIDE 7.45 MG/ML
10 INJECTION INTRAVENOUS ONCE
Status: COMPLETED | OUTPATIENT
Start: 2020-11-03 | End: 2020-11-03

## 2020-11-03 RX ADMIN — POTASSIUM CHLORIDE 40 MEQ: 750 TABLET, EXTENDED RELEASE ORAL at 21:27

## 2020-11-03 RX ADMIN — POTASSIUM CHLORIDE 10 MEQ: 7.46 INJECTION, SOLUTION INTRAVENOUS at 21:19

## 2020-11-03 ASSESSMENT — ENCOUNTER SYMPTOMS
DIARRHEA: 1
CHEST TIGHTNESS: 1
ABDOMINAL PAIN: 0
NAUSEA: 1
RHINORRHEA: 0
VOMITING: 0
SORE THROAT: 0
PHOTOPHOBIA: 0
COUGH: 1
SHORTNESS OF BREATH: 1

## 2020-11-03 NOTE — ED NOTES
Bed: 009A  Expected date: 11/3/20  Expected time: 5:00 PM  Means of arrival:   Comments:     Adrienne Councilman, STARR  11/03/20 2849

## 2020-11-03 NOTE — ED TRIAGE NOTES
Pt presents to ER with shortness of breath with COVID positive. Pt states she feels like she's not getting enough oxygen. Current pulse ox 90-94% on room air. Pt placed on 2L nasal cannula. EKG completed.

## 2020-11-04 LAB
ABO: NORMAL
RH FACTOR: NORMAL
TRANSFUSION REACTION REPORT: NORMAL

## 2020-11-04 PROCEDURE — 1200000003 HC TELEMETRY R&B

## 2020-11-04 PROCEDURE — 2500000003 HC RX 250 WO HCPCS: Performed by: PHYSICIAN ASSISTANT

## 2020-11-04 PROCEDURE — 2580000003 HC RX 258: Performed by: FAMILY MEDICINE

## 2020-11-04 PROCEDURE — 6370000000 HC RX 637 (ALT 250 FOR IP): Performed by: PHYSICIAN ASSISTANT

## 2020-11-04 PROCEDURE — 36430 TRANSFUSION BLD/BLD COMPNT: CPT

## 2020-11-04 PROCEDURE — 94640 AIRWAY INHALATION TREATMENT: CPT

## 2020-11-04 PROCEDURE — 2580000003 HC RX 258: Performed by: PHYSICIAN ASSISTANT

## 2020-11-04 PROCEDURE — 94760 N-INVAS EAR/PLS OXIMETRY 1: CPT

## 2020-11-04 PROCEDURE — 99232 SBSQ HOSP IP/OBS MODERATE 35: CPT | Performed by: FAMILY MEDICINE

## 2020-11-04 PROCEDURE — 6370000000 HC RX 637 (ALT 250 FOR IP): Performed by: FAMILY MEDICINE

## 2020-11-04 PROCEDURE — 6360000002 HC RX W HCPCS: Performed by: PHYSICIAN ASSISTANT

## 2020-11-04 RX ORDER — AZITHROMYCIN 250 MG/1
250 TABLET, FILM COATED ORAL DAILY
Status: DISCONTINUED | OUTPATIENT
Start: 2020-11-05 | End: 2020-11-06 | Stop reason: HOSPADM

## 2020-11-04 RX ORDER — AZITHROMYCIN 250 MG/1
500 TABLET, FILM COATED ORAL ONCE
Status: COMPLETED | OUTPATIENT
Start: 2020-11-04 | End: 2020-11-04

## 2020-11-04 RX ORDER — POTASSIUM CHLORIDE 7.45 MG/ML
10 INJECTION INTRAVENOUS PRN
Status: DISCONTINUED | OUTPATIENT
Start: 2020-11-04 | End: 2020-11-06 | Stop reason: HOSPADM

## 2020-11-04 RX ORDER — PROMETHAZINE HYDROCHLORIDE 25 MG/1
12.5 TABLET ORAL EVERY 6 HOURS PRN
Status: DISCONTINUED | OUTPATIENT
Start: 2020-11-04 | End: 2020-11-06 | Stop reason: HOSPADM

## 2020-11-04 RX ORDER — POTASSIUM CHLORIDE 20 MEQ/1
40 TABLET, EXTENDED RELEASE ORAL PRN
Status: DISCONTINUED | OUTPATIENT
Start: 2020-11-04 | End: 2020-11-06 | Stop reason: HOSPADM

## 2020-11-04 RX ORDER — SODIUM CHLORIDE 9 MG/ML
INJECTION, SOLUTION INTRAVENOUS CONTINUOUS
Status: DISCONTINUED | OUTPATIENT
Start: 2020-11-04 | End: 2020-11-06

## 2020-11-04 RX ORDER — POLYETHYLENE GLYCOL 3350 17 G/17G
17 POWDER, FOR SOLUTION ORAL DAILY PRN
Status: DISCONTINUED | OUTPATIENT
Start: 2020-11-04 | End: 2020-11-06 | Stop reason: HOSPADM

## 2020-11-04 RX ORDER — ACETAMINOPHEN 650 MG/1
650 SUPPOSITORY RECTAL EVERY 6 HOURS PRN
Status: DISCONTINUED | OUTPATIENT
Start: 2020-11-04 | End: 2020-11-06 | Stop reason: HOSPADM

## 2020-11-04 RX ORDER — SODIUM CHLORIDE 0.9 % (FLUSH) 0.9 %
10 SYRINGE (ML) INJECTION EVERY 12 HOURS SCHEDULED
Status: DISCONTINUED | OUTPATIENT
Start: 2020-11-04 | End: 2020-11-06 | Stop reason: HOSPADM

## 2020-11-04 RX ORDER — ONDANSETRON 2 MG/ML
4 INJECTION INTRAMUSCULAR; INTRAVENOUS EVERY 6 HOURS PRN
Status: DISCONTINUED | OUTPATIENT
Start: 2020-11-04 | End: 2020-11-06 | Stop reason: HOSPADM

## 2020-11-04 RX ORDER — FLUTICASONE PROPIONATE 50 MCG
2 SPRAY, SUSPENSION (ML) NASAL 2 TIMES DAILY
Status: DISCONTINUED | OUTPATIENT
Start: 2020-11-04 | End: 2020-11-06 | Stop reason: HOSPADM

## 2020-11-04 RX ORDER — MAGNESIUM SULFATE IN WATER 40 MG/ML
2 INJECTION, SOLUTION INTRAVENOUS PRN
Status: DISCONTINUED | OUTPATIENT
Start: 2020-11-04 | End: 2020-11-06 | Stop reason: HOSPADM

## 2020-11-04 RX ORDER — SODIUM CHLORIDE 0.9 % (FLUSH) 0.9 %
10 SYRINGE (ML) INJECTION PRN
Status: DISCONTINUED | OUTPATIENT
Start: 2020-11-04 | End: 2020-11-06 | Stop reason: HOSPADM

## 2020-11-04 RX ORDER — ACETAMINOPHEN 325 MG/1
650 TABLET ORAL EVERY 6 HOURS PRN
Status: DISCONTINUED | OUTPATIENT
Start: 2020-11-04 | End: 2020-11-06 | Stop reason: HOSPADM

## 2020-11-04 RX ADMIN — BUDESONIDE AND FORMOTEROL FUMARATE DIHYDRATE 2 PUFF: 160; 4.5 AEROSOL RESPIRATORY (INHALATION) at 16:34

## 2020-11-04 RX ADMIN — Medication 50 MG: at 09:37

## 2020-11-04 RX ADMIN — REMDESIVIR 100 MG: 100 INJECTION, POWDER, LYOPHILIZED, FOR SOLUTION INTRAVENOUS at 23:55

## 2020-11-04 RX ADMIN — MONTELUKAST SODIUM 10 MG: 10 TABLET ORAL at 20:42

## 2020-11-04 RX ADMIN — SODIUM CHLORIDE, PRESERVATIVE FREE 10 ML: 5 INJECTION INTRAVENOUS at 09:41

## 2020-11-04 RX ADMIN — SODIUM CHLORIDE: 9 INJECTION, SOLUTION INTRAVENOUS at 09:39

## 2020-11-04 RX ADMIN — REMDESIVIR 200 MG: 100 INJECTION, POWDER, LYOPHILIZED, FOR SOLUTION INTRAVENOUS at 00:56

## 2020-11-04 RX ADMIN — FLUTICASONE PROPIONATE 2 SPRAY: 50 SPRAY, METERED NASAL at 09:41

## 2020-11-04 RX ADMIN — SODIUM CHLORIDE: 9 INJECTION, SOLUTION INTRAVENOUS at 23:55

## 2020-11-04 RX ADMIN — OXYCODONE HYDROCHLORIDE AND ACETAMINOPHEN 500 MG: 500 TABLET ORAL at 09:37

## 2020-11-04 RX ADMIN — AZITHROMYCIN 500 MG: 250 TABLET, FILM COATED ORAL at 21:38

## 2020-11-04 RX ADMIN — ALBUTEROL SULFATE 6 PUFF: 90 AEROSOL, METERED RESPIRATORY (INHALATION) at 04:33

## 2020-11-04 RX ADMIN — ENOXAPARIN SODIUM 40 MG: 40 INJECTION SUBCUTANEOUS at 09:00

## 2020-11-04 RX ADMIN — HYDROCHLOROTHIAZIDE 12.5 MG: 25 TABLET ORAL at 09:36

## 2020-11-04 RX ADMIN — DEXAMETHASONE SODIUM PHOSPHATE 6 MG: 4 INJECTION, SOLUTION INTRA-ARTICULAR; INTRALESIONAL; INTRAMUSCULAR; INTRAVENOUS; SOFT TISSUE at 09:37

## 2020-11-04 RX ADMIN — ROSUVASTATIN CALCIUM 10 MG: 10 TABLET, FILM COATED ORAL at 09:36

## 2020-11-04 RX ADMIN — FLUTICASONE PROPIONATE 2 SPRAY: 50 SPRAY, METERED NASAL at 20:42

## 2020-11-04 RX ADMIN — ATENOLOL 50 MG: 50 TABLET ORAL at 09:37

## 2020-11-04 RX ADMIN — ASPIRIN 81 MG: 81 TABLET, CHEWABLE ORAL at 09:37

## 2020-11-04 RX ADMIN — Medication 1000 UNITS: at 09:37

## 2020-11-04 RX ADMIN — ACETAMINOPHEN 650 MG: 650 SUPPOSITORY RECTAL at 04:59

## 2020-11-04 RX ADMIN — SODIUM CHLORIDE 20 ML: 9 INJECTION, SOLUTION INTRAVENOUS at 02:00

## 2020-11-04 RX ADMIN — ENOXAPARIN SODIUM 40 MG: 40 INJECTION SUBCUTANEOUS at 20:42

## 2020-11-04 ASSESSMENT — ENCOUNTER SYMPTOMS
GASTROINTESTINAL NEGATIVE: 1
COUGH: 1
EYES NEGATIVE: 1
SHORTNESS OF BREATH: 0
ALLERGIC/IMMUNOLOGIC NEGATIVE: 1

## 2020-11-04 ASSESSMENT — PAIN SCALES - GENERAL
PAINLEVEL_OUTOF10: 0
PAINLEVEL_OUTOF10: 0

## 2020-11-04 NOTE — FLOWSHEET NOTE
[facility]      Patient: Duncan Addison  Room #: 2L-76/747-G            YOB: 1950  Age: 69 y.o. Gender: female            Admit Date & Time: 11/3/2020  6:15 PM    Assessment:   spoke with the pt over the phone. Pt was having a difficult time breathing, so the conversation was a short one. Pt is a member of [employer]. The pt' said that she planned on speaking with her  later today. Pt relies on her pedro for strength and comfort. Pt shared that the last couple of days have been overwhelming. Interventions:   offered words of support and prayed with the pt. Outcomes:  Pt was appreciate of the phone call. Plan:  1. Provide spiritual care and support. 2.  Conduct a more complete spiritual assessment when pt's health has improved. Electronically signed by Anyi Galvan, on 11/4/2020 at 1:13 PM.  [facility]  [phone]       11/04/20 1312   Encounter Summary   Services provided to: Patient   Referral/Consult From: [facility] Family members; [facility]/pedro community   Place of [employer]   ([facility])   Contact [facility] No   Complexity of Encounter Moderate   Length of Encounter 15 minutes   Spiritual Assessment Completed Yes   Spiritual/Adventism   Type Spiritual support   Assessment Approachable   Intervention Active listening;Explored feelings, thoughts, concerns;Prayer;Sustaining presence/ Ministry of presence   Outcome Connection/belonging;Expressed gratitude;Expressed feelings/needs/concerns;Engaged in conversation

## 2020-11-04 NOTE — ED PROVIDER NOTES
[address] [facility] [address] [address] EMERGENCY DEPT      CHIEF COMPLAINT       Chief Complaint   Patient presents with    Shortness of Breath    Concern For COVID-19     positive       Nurses Notes reviewed and I agree except as noted in the HPI. HISTORY OF PRESENT ILLNESS    Duncan Addison is a 78 y.o. female who presents for not feeling well. Patient has no energy, dyspnea on exertion, loss of appetite, loss of taste, decrease in food intake, cough, subjective fever, chills, nausea, and diarrhea. Her chest feels heavy but she denies chest pain. 11 days ago patient was exposed to Covid. She started to feel mildly ill about a week ago and tested positive for Covid. Since then she has had a decline mainly in her energy level and appetite. She feels wiped out. She has not eaten hardly anything in the past 1 week. She has a history of hypertension, controlled asthma, and borderline diabetes. Patient denies nasal congestion, sore throat, visual changes, headache, dizziness, or changes in urination. REVIEW OF SYSTEMS     Review of Systems   Constitutional: Positive for activity change, appetite change, chills, fatigue and fever. Negative for diaphoresis. HENT: Negative for congestion, rhinorrhea and sore throat. Eyes: Negative for photophobia and visual disturbance. Respiratory: Positive for cough, chest tightness (\"Heavy\") and shortness of breath. Cardiovascular: Negative for chest pain and leg swelling. Gastrointestinal: Positive for diarrhea and nausea. Negative for abdominal pain and vomiting. Genitourinary: Negative for decreased urine volume and difficulty urinating. Musculoskeletal: Positive for myalgias. Negative for gait problem. Skin: Negative for rash. Neurological: Positive for weakness. Negative for dizziness, light-headedness, numbness and headaches. Psychiatric/Behavioral: Negative for confusion. The patient is not nervous/anxious.          PAST MEDICAL HISTORY    has a past medical history of Allergic rhinitis, Asthma, Hyperlipidemia, Hypertension, and PONV (postoperative nausea and vomiting). SURGICAL HISTORY      has a past surgical history that includes Cholecystectomy (10/20/2011); bladder suspension (); Hysterectomy (); Tonsillectomy and adenoidectomy; Adenoidectomy; Breast surgery (2010); Colonoscopy; and Upper gastrointestinal endoscopy. CURRENT MEDICATIONS       Previous Medications    ALBUTEROL (PROAIR HFA) 108 (90 BASE) MCG/ACT INHALER    Inhale 2 puffs into the lungs every 6 hours as needed. ATENOLOL (TENORMIN) 50 MG TABLET    Take 50 mg by mouth daily. CETIRIZINE (ZYRTEC) 10 MG TABLET    Take 10 mg by mouth daily    HYDROCHLOROTHIAZIDE (HYDRODIURIL) 12.5 MG TABLET    Take 12.5 mg by mouth daily. IBUPROFEN (ADVIL;MOTRIN) 200 MG TABLET    Take 200 mg by mouth every 6 hours as needed for Pain    MOMETASONE-FORMOTEROL (DULERA) 100-5 MCG/ACT INHALER    Inhale 2 puffs into the lungs 2 times daily    MONTELUKAST (SINGULAIR) 10 MG TABLET    Take 10 mg by mouth nightly    MULTIPLE VITAMINS-MINERALS (CENTRUM SILVER) TABS    Take  by mouth daily. ROSUVASTATIN (CRESTOR) 10 MG TABLET    Take 10 mg by mouth daily. TRIAMCINOLONE ACETONIDE (NASACORT AQ NA)    by Nasal route as needed       ALLERGIES     has No Known Allergies. FAMILY HISTORY     She indicated that her mother is . She indicated that her father is . She indicated that both of her sisters are alive. She indicated that two of her four brothers are alive. She indicated that the status of her paternal grandfather is unknown. She indicated that the status of her neg hx is unknown.   family history includes Cancer in her brother and brother; Diabetes in her brother, brother, father, mother, and sister; Heart Disease in her brother; High Blood Pressure in her brother, brother, brother, father, and mother; Laneta Copier in her paternal grandfather.     SOCIAL HISTORY    reports that she quit smoking about 19 years ago. She has never used smokeless tobacco. She reports that she does not drink alcohol or use drugs. PHYSICAL EXAM     INITIAL VITALS:  height is 4' 11.5\" (1.511 m) and weight is 165 lb (74.8 kg). Her oral temperature is 99.3 °F (37.4 °C). Her blood pressure is 135/68 and her pulse is 75. Her respiration is 22 and oxygen saturation is 100%. Physical Exam  Constitutional:       Appearance: Normal appearance. She is well-developed. She is not ill-appearing or diaphoretic. HENT:      Head: Normocephalic and atraumatic. Right Ear: Hearing normal.      Left Ear: Hearing normal.      Nose: Nose normal. No rhinorrhea. Mouth/Throat:      Lips: Pink. Mouth: Mucous membranes are moist.      Pharynx: Oropharynx is clear. Eyes:      General: Lids are normal. No scleral icterus. Extraocular Movements: Extraocular movements intact. Conjunctiva/sclera: Conjunctivae normal.      Pupils: Pupils are equal, round, and reactive to light. Neck:      Musculoskeletal: Normal range of motion and neck supple. No neck rigidity. Trachea: Trachea normal.   Cardiovascular:      Rate and Rhythm: Normal rate and regular rhythm. Heart sounds: Normal heart sounds. No murmur. Pulmonary:      Effort: Pulmonary effort is normal.      Breath sounds: Normal breath sounds and air entry. No decreased breath sounds, wheezing or rhonchi. Abdominal:      General: There is no distension. Palpations: Abdomen is soft. Tenderness: There is no abdominal tenderness. Musculoskeletal:      Comments: Well perfused; movement normal as observed; no signs of DVT   Lymphadenopathy:      Cervical: No cervical adenopathy. Skin:     General: Skin is warm and dry. Findings: No rash. Neurological:      General: No focal deficit present. Mental Status: She is alert. Sensory: Sensation is intact. Motor: Motor function is intact. Gait: Gait is intact. Psychiatric:         Mood and Affect: Mood normal.         Speech: Speech normal.         Behavior: Behavior is cooperative. DIFFERENTIAL DIAGNOSIS:   Including but not limited to: Covid pneumonia, dehydration, metabolic derangement, ACS, hypoxia    DIAGNOSTIC RESULTS     EKG: All EKG's are [facility] who either signs or Co-signs this chart in the absence of a cardiologist.  Ventricular rate 82 bpm  AL interval 144 ms  QRS duration 92 ms  QTc interval 432 ms  P discharge ST axes 61, 68, and 56  Normal sinus rhythm. No STEMI    RADIOLOGY: non-plain film images(s) such as CT,Ultrasound and MRI are read by the radiologist.  Plain radiographic images are visualized and preliminarily interpreted by the emergency physician unless otherwise stated below. XR CHEST PORTABLE   Final Result   No acute findings               **This report has been created using voice recognition software. It may contain minor errors which are inherent in voice recognition technology. **      Final report electronically signed by Dr. Inga Aguirre on 11/3/2020 7:46 PM          LABS:   Labs Reviewed   CBC WITH AUTO DIFFERENTIAL - Abnormal; Notable for the following components:       Result Value    WBC 4.6 (*)     Lymphocytes Absolute 0.7 (*)     All other components within normal limits   COMPREHENSIVE METABOLIC PANEL - Abnormal; Notable for the following components:    Glucose 151 (*)     Sodium 131 (*)     Potassium 2.9 (*)     Chloride 88 (*)     AST 71 (*)     All other components within normal limits   C-REACTIVE PROTEIN - Abnormal; Notable for the following components:    CRP 5.23 (*)     All other components within normal limits   FERRITIN - Abnormal; Notable for the following components:    Ferritin 711 (*)     All other components within normal limits   LACTATE DEHYDROGENASE - Abnormal; Notable for the following components:     (*)     All other components within normal limits PROCALCITONIN - Abnormal; Notable for the following components:    Procalcitonin 0.24 (*)     All other components within normal limits   D-DIMER, QUANTITATIVE - Abnormal; Notable for the following components:    D-Dimer, Quant 627.00 (*)     All other components within normal limits   GLOMERULAR FILTRATION RATE, ESTIMATED - Abnormal; Notable for the following components:    Est, Glom Filt Rate 71 (*)     All other components within normal limits   OSMOLALITY - Abnormal; Notable for the following components:    Osmolality Calc 267.5 (*)     All other components within normal limits   CULTURE, BLOOD 1   CULTURE, BLOOD 2   TROPONIN   LACTATE, SEPSIS   ANION GAP       EMERGENCY DEPARTMENT COURSE:   Vitals:    Vitals:    11/03/20 1837 11/03/20 1946   BP: 129/77 135/68   Pulse: 81 75   Resp: 16 22   Temp: 99.3 °F (37.4 °C)    TempSrc: Oral    SpO2: 92% 100%   Weight: 165 lb (74.8 kg)    Height: 4' 11.5\" (1.511 m)      **Pathway: ACEP Classification of Severity** _Section: Step 1/2: Initial Assessment_ - Based on the patient's initial assessment, their severity classification is: Moderate _Section: Step 3b: Symptoms and Risk Factors_ - At the time of evaluation, the patient was displaying some concerning symptoms or risk factors. _Section: Step 5: Diagnostic Testing_ - Initial chest x-ray results may have some value in predicting outcomes in patients with confirmed COVID-19. - Labs should be obtained to assist with initial management, risk stratification, and prognostication. _Section: Step 6: Diagnostic Results_ - The patient has severe diagnostic results. 2029: Consulted Dr. Bryant Jones in regards to admission    MDM:  The patient was seen by me in the emergency room for worsening of Covid symptoms including shortness of breath, lack of energy, and appetite. Physical exam revealed a nontoxic-appearing 78-year-old female with no respiratory distress. Vital signs reviewed and noted stable.   Old records were reviewed. Appropriate laboratory and imaging studies were ordered and reviewed upon completion. Pertinent findings: WBC 4.6, sodium 131, potassium 2.9, CRP 5.23, ferritin 711, , procalcitonin 0.24, D-dimer 627    Interventions: Oxygen 1 to 2 L via nasal cannula, IV and oral potassium; patient declined other medications for her symptoms    Reexamination: Patient remained stable with good vital signs. Tolerating oral fluids well. Results were discussed with the patient as well as desire for admission and they were amenable. Dr. Charlette Morales of our hospitalists' service was consulted and graciously accepted admission. The patient was admitted to the hospital in fair condition. CRITICAL CARE:   None    CONSULTS:  Dr. Charlette Morales (hospitalist)    PROCEDURES:  None    FINAL IMPRESSION      1. COVID-19 virus infection    2. Hypokalemia    3. Dyspnea and respiratory abnormalities          DISPOSITION/PLAN     1. COVID-19 virus infection    2. Hypokalemia    3.  Dyspnea and respiratory abnormalities    Admission    (Please note that portions of this note were completed with a voice recognition program.  Efforts were made to edit the dictations but occasionally words are mis-transcribed.)    Meme Hernandez PA-C 11/03/20 8:32 PM    OLIVIER Brar PA-C  11/03/20 4590

## 2020-11-04 NOTE — CARE COORDINATION
11/4/20, 12:22 PM EST  DISCHARGE PLANNING EVALUATION:    Chary Rosas       Admitted from: ER 11/3/2020/ [facility], [address] day: 1   Location: [address] [place] [address] Reason for admit: Acute respiratory failure due to COVID-19 ([facility] [[email or web address]]ca 75.) [U07.1, J96.00] Status: Inpt  Admit order signed?: yes  PMH:  has a past medical history of Allergic rhinitis, Asthma, Hyperlipidemia, Hypertension, and PONV (postoperative nausea and vomiting). Procedure: No  Pertinent abnormal Imaging:No  Medications:  Scheduled Meds:   sodium chloride flush  10 mL Intravenous 2 times per day    enoxaparin  40 mg Subcutaneous BID    fluticasone  2 spray Each Nostril BID    atenolol  50 mg Oral Daily    [Held by provider] cetirizine  10 mg Oral Daily    hydroCHLOROthiazide  12.5 mg Oral Daily    budesonide-formoterol  2 puff Inhalation BID    montelukast  10 mg Oral Nightly    rosuvastatin  10 mg Oral Daily    zinc sulfate  50 mg Oral Daily    vitamin C  500 mg Oral Daily    Vitamin D  1,000 Units Oral Daily    aspirin  81 mg Oral Daily    dexamethasone  6 mg Intravenous Daily    [START ON 11/5/2020] remdesivir IVPB  100 mg Intravenous Q24H     Continuous Infusions:   sodium chloride 75 mL/hr at 11/04/20 0939      Pertinent Info/Orders/Treatment Plan: To ER due to not feeling well, poor energy, dyspnea, loss of appetite, chills, nausea. DX Covid one week ago. Presently on room air with sat 93%. Afebrile. IVF 75/hr. Dexamethasone. Remdesivir. Vits and Zinc.  Diet: DIET GENERAL;   Smoking status:  reports that she quit smoking about 19 years ago.  She has never used smokeless tobacco.   PCP: Rica Wheatley MD  Readmission 30 days or less: No  Readmission Risk Score: 9%    Discharge Planning Evaluation  Current Residence:     Living Arrangements:      Support Systems:     Current Services PTA:     Potential Assistance Needed:     Potential Assistance Purchasing Medications:     Does patient want to participate in local refill/ meds to beds program? Type of Home Care Services:     Patient expects to be discharged to:     Expected Discharge date:  11/10/20  Follow Up Appointment: Best Day/ Time:      Patient Goals/Plan/Treatment Preferences: Met with pt today. She is from home alone and is self sufficient. She has no services or DME. She drives, has a PCP, no issues with basic needs or getting meds. No discharge services requested at present. She does prefer [facility] if needing home O2 at time of discharge. Transportation/Food Security/Housekeeping Addressed:  No issues identified.     Evaluation: no

## 2020-11-04 NOTE — PROGRESS NOTES
Convalescent plasma given. Upon completing transfusion patient had temp of 103.0 oral, 102.3 rectally. [employer]	[place] contacted. PRN albuterol inhaler given, PRN tylenol suppository given. Ice Packs placed behind knees and under armpits. Transfusion reaction forms, bag, and tubing sent to lab.

## 2020-11-04 NOTE — PROGRESS NOTES
Hospitalist Progress Note      Patient:  Aniyah Garcia    Unit/Bed:8B-34/034-A  YOB: 1950  MRN: [id number]   Acct: [de-identified]   PCP: Soren Castaneda MD  Date of Admission: 11/3/2020    Assessment and Plan:        1. COVID-19 virus infection: Treatment as below. 2. Acute hypoxic respiratory failure due to COVID 19/possible atypical bacterial pneumonia: initially hypoxic requiring 2L O2, O2 titration protocol, albuterol inhaler for shortness of breath, vitamin C, vitamin D, zinc, ASA, Lovenox BID, dexamethasone, pharmacy to dose remdesivir, convalescent plasma --> consent signed in the ED  11/4/2020: Continue vitamin C, vitamin D, zinc, Lovenox twice daily, dexamethasone, remdesivir, I will add Flonase nasal spray. I will add azithromycin for 5 days for possible atypical pneumonia. Continue wean off oxygen as tolerated. 3. Possible atypical pneumonia: Procalcitonin elevated, repeat tomorrow. As mentioned above  4. Essential hypertension: continue home medications  5. Hypokalemia: replacement protocol, follow with labs, possibly due to diarrhea    PMH, PSH, SH, FHX reviewed in chart review snap shot in EPIC    CC: COVID-19 virus infection, acute hypoxic respiratory failure secondary to COVID-19 virus infection. HPI: Initial admission HPI by admitting physician reviewed as below:  Patient presents to the ED with a 5 day history of COVID 19 infection. Patient reports exposure to COVID within her family. She tested positive 5 days ago in her PCP office after she was having increased fatigue. She has developed shortness of breath worse with exertion over the past 2 days. The patient also endorses loss of taste and smell, anorexia, fatigue, myalgias and arthralgias, nausea, and  a few episodes of diarrhea. Patient was found to be hypoxic with activity and admitted for oxygen therapy and more aggressive treatment of her COVID 19 infection.   For further details and updates please refer to assessment and plan at the beginning of the note. ROS (10 point review of systems completed. Pertinent positives noted. Otherwise ROS is negative) : Shortness of breath  PMH:  Per HPI and reviewed in the chart  SHX: Reviewed in the chart, also the patient  FHX: Reviewed in the chart, also with the patient  Allergies: Reviewed in the chart  Medications:       sodium chloride flush  10 mL Intravenous 2 times per day    enoxaparin  40 mg Subcutaneous BID    atenolol  50 mg Oral Daily    cetirizine  10 mg Oral Daily    hydroCHLOROthiazide  12.5 mg Oral Daily    budesonide-formoterol  2 puff Inhalation BID    montelukast  10 mg Oral Nightly    rosuvastatin  10 mg Oral Daily    zinc sulfate  50 mg Oral Daily    vitamin C  500 mg Oral Daily    Vitamin D  1,000 Units Oral Daily    aspirin  81 mg Oral Daily    dexamethasone  6 mg Intravenous Daily    [START ON 11/5/2020] remdesivir IVPB  100 mg Intravenous Q24H   Subjective 11/4/2020: Still using 4 L nasal cannula oxygen, currently on remdesivir and dexamethasone in addition to multivitamins as mentioned in assessment and plan, I will add Flonase nasal spray. Procalcitonin elevated, I will add azithromycin for total 5 days for ultimate management and possible bacterial pneumonia in addition to viral pneumonia. Vital Signs:   Vitals reviewed and compared with the previous ones  BP (!) 128/53   Pulse 86   Temp 100.2 °F (37.9 °C)   Resp 24   Ht 4' 11.5\" (1.511 m)   Wt 165 lb (74.8 kg)   SpO2 96%   BMI 32.77 kg/m²    No intake or output data in the 24 hours ending 11/04/20 0838     General:   Age-appropriate, in no acute distress  HEENT:  normocephalic and atraumatic. No scleral icterus. PERRLA. Neck: supple. No thyromegaly. Lungs: Decreased breath sounds bilaterally, scattered crackles bilaterally. Cardiac: S1, S2, RRR, no JVD. Abdomen: soft. Increased abdominal girth, nontender. Bowel sounds positive.   Extremities:  No clubbing, cyanosis, or Costophrenic angles are preserved. Cardiomediastinal silhouette is within normal limits. Ectatic thoracic aorta. No acute osseous findings. No acute findings **This report has been created using voice recognition software. It may contain minor errors which are inherent in voice recognition technology. ** Final report electronically signed by Dr. Mila Hay on 11/3/2020 7:46 PM      Discussed plan with patient and nurse. Patient and nurse verbalized understanding and agree. All questions addressed with concerns. Please excuse my TYPOS!     Electronically signed by Placido Snowden MD on 11/4/2020 at 8:38 AM

## 2020-11-04 NOTE — H&P
Hospitalist - History & Physical      Patient: Mannie Gutierrez    Unit/Bed:09/009A  YOB: 1950  MRN: [id number]   Acct: [de-identified]   PCP: Victoria Gipson MD      Assessment and Plan:        1. Acute hypoxic respiratory failure due to COVID 19: initially hypoxic requiring 2L O2, O2 titration protocol, albuterol inhaler for shortness of breath, vitamin C, vitamin D, zinc, ASA, Lovenox BID, dexamethasone, pharmacy to dose remdesivir, convalescent plasma --> consent signed in the ED  2. Essential hypertension: continue home medications  3. Hypokalemia: replacement protocol, follow with labs, possibly due to diarrhea      CC:  Shortness of breath  HPI: Patient presents to the ED with a 5 day history of COVID 19 infection. Patient reports exposure to COVID within her family. She tested positive 5 days ago in her PCP office after she was having increased fatigue. She has developed shortness of breath worse with exertion over the past 2 days. The patient also endorses loss of taste and smell, anorexia, fatigue, myalgias and arthralgias, nausea, and  a few episodes of diarrhea. Patient was found to be hypoxic with activity and admitted for oxygen therapy and more aggressive treatment of her COVID 19 infection. ROS: Review of Systems   Constitutional: Positive for fatigue and fever. HENT: Negative. Eyes: Negative. Respiratory: Positive for cough. Negative for shortness of breath. Cardiovascular: Negative. Gastrointestinal: Negative. Endocrine: Negative. Genitourinary: Negative. Musculoskeletal: Positive for arthralgias and myalgias. Skin: Negative. Allergic/Immunologic: Negative. Neurological: Positive for weakness. Hematological: Negative. Psychiatric/Behavioral: Negative.       PMH:    Past Medical History:   Diagnosis Date    Allergic rhinitis     Asthma     Hyperlipidemia     Hypertension     PONV (postoperative nausea and vomiting)     patch helps SHX:    Social History     Socioeconomic History    Marital status:      Spouse name: Not on file    Number of children: Not on file    Years of education: Not on file    Highest education level: Not on file   Occupational History    Not on file   Social Needs    Financial resource strain: Not on file    Food insecurity     Worry: Not on file     Inability: Not on file    Transportation needs     Medical: Not on file     Non-medical: Not on file   Tobacco Use    Smoking status: Former Smoker     Last attempt to quit: 2001     Years since quittin.3    Smokeless tobacco: Never Used   Substance and Sexual Activity    Alcohol use: No    Drug use: No    Sexual activity: Not on file   Lifestyle    Physical activity     Days per week: Not on file     Minutes per session: Not on file    Stress: Not on file   Relationships    Social connections     Talks on phone: Not on file     Gets together: Not on file     Attends Yazidism service: Not on file     Active member of club or organization: Not on file     Attends meetings of clubs or organizations: Not on file     Relationship status: Not on file    Intimate partner violence     Fear of current or ex partner: Not on file     Emotionally abused: Not on file     Physically abused: Not on file     Forced sexual activity: Not on file   Other Topics Concern    Not on file   Social History Narrative    Not on file     FHX:   Family History   Problem Relation Age of Onset    High Blood Pressure Mother     Diabetes Mother     High Blood Pressure Father     Diabetes Father     Macular Degen Paternal Grandfather     Diabetes Sister     Cancer Brother     Diabetes Brother     Heart Disease Brother     High Blood Pressure Brother     Cancer Brother     Diabetes Brother     High Blood Pressure Brother     High Blood Pressure Brother     Stroke Neg Hx      Allergies: No Known Allergies  Medications:       potassium chloride  40 mEq Oral Once    potassium chloride  10 mEq Intravenous Once          Labs:   Recent Results (from the past 24 hour(s))   EKG SOB    Collection Time: 11/03/20  6:34 PM   Result Value Ref Range    Ventricular Rate 82 BPM    Atrial Rate 82 BPM    P-R Interval 144 ms    QRS Duration 92 ms    Q-T Interval 370 ms    QTc Calculation (Bazett) 432 ms    P Axis 61 degrees    R Axis 68 degrees    T Axis 56 degrees   CBC auto differential    Collection Time: 11/03/20  6:50 PM   Result Value Ref Range    WBC 4.6 (L) 4.8 - 10.8 thou/mm3    RBC 4.99 4.20 - 5.40 mill/mm3    Hemoglobin 15.8 12.0 - 16.0 gm/dl    Hematocrit 45.3 37.0 - 47.0 %    MCV 90.8 81.0 - 99.0 fL    MCH 31.7 26.0 - 33.0 pg    MCHC 34.9 32.2 - 35.5 gm/dl    RDW-CV 12.2 11.5 - 14.5 %    RDW-SD 40.7 35.0 - 45.0 fL    Platelets 286 042 - 508 thou/mm3    MPV 10.2 9.4 - 12.4 fL    Seg Neutrophils 75.2 %    Lymphocytes 16.0 %    Monocytes 8.2 %    Eosinophils 0.0 %    Basophils 0.2 %    Immature Granulocytes 0.4 %    Segs Absolute 3.5 1.8 - 7.7 thou/mm3    Lymphocytes Absolute 0.7 (L) 1.0 - 4.8 thou/mm3    Monocytes Absolute 0.4 0.4 - 1.3 thou/mm3    Eosinophils Absolute 0.0 0.0 - 0.4 thou/mm3    Basophils Absolute 0.0 0.0 - 0.1 thou/mm3    Immature Grans (Abs) 0.02 0.00 - 0.07 thou/mm3    nRBC 0 /100 wbc   Comprehensive Metabolic Panel    Collection Time: 11/03/20  6:50 PM   Result Value Ref Range    Glucose 151 (H) 70 - 108 mg/dL    CREATININE 0.8 0.4 - 1.2 mg/dL    BUN 18 7 - 22 mg/dL    Sodium 131 (L) 135 - 145 meq/L    Potassium 2.9 (L) 3.5 - 5.2 meq/L    Chloride 88 (L) 98 - 111 meq/L    CO2 29 23 - 33 meq/L    Calcium 9.6 8.5 - 10.5 mg/dL    AST 71 (H) 5 - 40 U/L    Alkaline Phosphatase 78 38 - 126 U/L    Total Protein 7.4 6.1 - 8.0 g/dL    Alb 4.1 3.5 - 5.1 g/dL    Total Bilirubin 0.7 0.3 - 1.2 mg/dL    ALT 55 11 - 66 U/L   Troponin    Collection Time: 11/03/20  6:50 PM   Result Value Ref Range    Troponin T < 0.010 ng/ml   C-reactive protein    Collection Time: 11/03/20  6:50 PM   Result Value Ref Range    CRP 5.23 (H) 0.00 - 1.00 mg/dl   Ferritin    Collection Time: 11/03/20  6:50 PM   Result Value Ref Range    Ferritin 711 (H) 10 - 291 ng/mL   Lactate Dehydrogenase    Collection Time: 11/03/20  6:50 PM   Result Value Ref Range     (H) 100 - 190 U/L   Procalcitonin    Collection Time: 11/03/20  6:50 PM   Result Value Ref Range    Procalcitonin 0.24 (H) 0.01 - 0.09 ng/mL   D-dimer, quantitative    Collection Time: 11/03/20  6:50 PM   Result Value Ref Range    D-Dimer, Quant 627.00 (H) 0.00 - 500.00 ng/ml FEU   Anion Gap    Collection Time: 11/03/20  6:50 PM   Result Value Ref Range    Anion Gap 14.0 8.0 - 16.0 meq/L   Glomerular Filtration Rate, Estimated    Collection Time: 11/03/20  6:50 PM   Result Value Ref Range    Est, Glom Filt Rate 71 (A) ml/min/1.73m2   Osmolality    Collection Time: 11/03/20  6:50 PM   Result Value Ref Range    Osmolality Calc 267.5 (L) 275.0 - 300.0 mOsmol/kg   Lactate, Sepsis    Collection Time: 11/03/20  7:45 PM   Result Value Ref Range    Lactic Acid, Sepsis 1.4 0.5 - 1.9 mmol/L         Vital Signs: T: 99.3F P: 71 RR: 18 B/P: 142/80: FiO2: 2L: O2 Sat:100%: I/O: No intake or output data in the 24 hours ending 11/03/20 2038      General:   Well appearing, no acute distress  HEENT:  normocephalic and atraumatic. No scleral icterus. PEARLA, mucous membranes moist  Neck: supple. Trachea midline. No JVD. Full ROM, no meningismus. Lungs: clear to diminished on auscultation. No retractions, no accessory muscle use. Cardiac: RRR, no murmur, 2+ pulses  Abdomen: soft. Nontender. Bowel sounds active  Extremities:  No clubbing, cyanosis x 4, no edema    Vasculature: capillary refill < 3 seconds. Skin:  warm and dry. no visible rashes  Psych:  Alert and oriented x3. Affect appropriate  Lymph:  No supraclavicular adenopathy. Neurologic:  CN II-XII grossly intact. No focal deficit.     Data: (All radiographs, tracings, PFTs, and imaging are personally viewed and interpreted unless otherwise noted).     EKG: rhythm: normal sinus rhythm, rate=82 bpm, aw=760 ms, qrs=92 ms, nq=522 ms, axis=61 degrees        Electronically signed by  Corry Cyr PA-C

## 2020-11-04 NOTE — ED NOTES
ED to inpatient nurses report    Chief Complaint   Patient presents with    Shortness of Breath    Concern For COVID-19     positive      Present to ED from home  LOC: alert and orientated to name, place, date  Vital signs   Vitals:    11/03/20 1837 11/03/20 1946   BP: 129/77 135/68   Pulse: 81 75   Resp: 16 22   Temp: 99.3 °F (37.4 °C)    TempSrc: Oral    SpO2: 92% 100%   Weight: 165 lb (74.8 kg)    Height: 4' 11.5\" (1.511 m)       Oxygen Baseline 98    Current needs required 2 Bipap/Cpap No  LDAs:    Mobility: Independent  Pending ED orders: potassium   Present condition: stable    Electronically signed by Cee Martin RN on 11/3/2020 at 9:08 PM     Cee Martin RN  11/03/20 7760

## 2020-11-05 LAB
ALBUMIN SERPL-MCNC: 3 G/DL (ref 3.5–5.1)
ALP BLD-CCNC: 67 U/L (ref 38–126)
ALT SERPL-CCNC: 44 U/L (ref 11–66)
ANION GAP SERPL CALCULATED.3IONS-SCNC: 12 MEQ/L (ref 8–16)
AST SERPL-CCNC: 50 U/L (ref 5–40)
BASOPHILS # BLD: 0.3 %
BASOPHILS ABSOLUTE: 0 THOU/MM3 (ref 0–0.1)
BILIRUB SERPL-MCNC: 0.5 MG/DL (ref 0.3–1.2)
BILIRUBIN DIRECT: < 0.2 MG/DL (ref 0–0.3)
BUN BLDV-MCNC: 10 MG/DL (ref 7–22)
CALCIUM SERPL-MCNC: 8.5 MG/DL (ref 8.5–10.5)
CHLORIDE BLD-SCNC: 101 MEQ/L (ref 98–111)
CO2: 24 MEQ/L (ref 23–33)
CREAT SERPL-MCNC: 0.6 MG/DL (ref 0.4–1.2)
EOSINOPHIL # BLD: 0 %
EOSINOPHILS ABSOLUTE: 0 THOU/MM3 (ref 0–0.4)
ERYTHROCYTE [DISTWIDTH] IN BLOOD BY AUTOMATED COUNT: 12.2 % (ref 11.5–14.5)
ERYTHROCYTE [DISTWIDTH] IN BLOOD BY AUTOMATED COUNT: 40.9 FL (ref 35–45)
GFR SERPL CREATININE-BSD FRML MDRD: > 90 ML/MIN/1.73M2
GLUCOSE BLD-MCNC: 123 MG/DL (ref 70–108)
HCT VFR BLD CALC: 39.4 % (ref 37–47)
HEMOGLOBIN: 13.1 GM/DL (ref 12–16)
IMMATURE GRANS (ABS): 0.02 THOU/MM3 (ref 0–0.07)
IMMATURE GRANULOCYTES: 0.5 %
LYMPHOCYTES # BLD: 17.5 %
LYMPHOCYTES ABSOLUTE: 0.7 THOU/MM3 (ref 1–4.8)
MAGNESIUM: 2 MG/DL (ref 1.6–2.4)
MCH RBC QN AUTO: 30.7 PG (ref 26–33)
MCHC RBC AUTO-ENTMCNC: 33.2 GM/DL (ref 32.2–35.5)
MCV RBC AUTO: 92.3 FL (ref 81–99)
MONOCYTES # BLD: 13.3 %
MONOCYTES ABSOLUTE: 0.5 THOU/MM3 (ref 0.4–1.3)
NUCLEATED RED BLOOD CELLS: 0 /100 WBC
PLATELET # BLD: 137 THOU/MM3 (ref 130–400)
PMV BLD AUTO: 10.5 FL (ref 9.4–12.4)
POTASSIUM REFLEX MAGNESIUM: 2.6 MEQ/L (ref 3.5–5.2)
POTASSIUM SERPL-SCNC: 3.6 MEQ/L (ref 3.5–5.2)
PROCALCITONIN: 0.29 NG/ML (ref 0.01–0.09)
RBC # BLD: 4.27 MILL/MM3 (ref 4.2–5.4)
SEG NEUTROPHILS: 68.4 %
SEGMENTED NEUTROPHILS ABSOLUTE COUNT: 2.6 THOU/MM3 (ref 1.8–7.7)
SODIUM BLD-SCNC: 137 MEQ/L (ref 135–145)
TOTAL PROTEIN: 5.6 G/DL (ref 6.1–8)
WBC # BLD: 3.8 THOU/MM3 (ref 4.8–10.8)

## 2020-11-05 PROCEDURE — 84132 ASSAY OF SERUM POTASSIUM: CPT

## 2020-11-05 PROCEDURE — 6370000000 HC RX 637 (ALT 250 FOR IP): Performed by: PHYSICIAN ASSISTANT

## 2020-11-05 PROCEDURE — 83735 ASSAY OF MAGNESIUM: CPT

## 2020-11-05 PROCEDURE — 2580000003 HC RX 258: Performed by: FAMILY MEDICINE

## 2020-11-05 PROCEDURE — 36415 COLL VENOUS BLD VENIPUNCTURE: CPT

## 2020-11-05 PROCEDURE — 1200000003 HC TELEMETRY R&B

## 2020-11-05 PROCEDURE — 80076 HEPATIC FUNCTION PANEL: CPT

## 2020-11-05 PROCEDURE — 80048 BASIC METABOLIC PNL TOTAL CA: CPT

## 2020-11-05 PROCEDURE — 85025 COMPLETE CBC W/AUTO DIFF WBC: CPT

## 2020-11-05 PROCEDURE — 99231 SBSQ HOSP IP/OBS SF/LOW 25: CPT | Performed by: FAMILY MEDICINE

## 2020-11-05 PROCEDURE — 6370000000 HC RX 637 (ALT 250 FOR IP): Performed by: FAMILY MEDICINE

## 2020-11-05 PROCEDURE — 2580000003 HC RX 258: Performed by: PHYSICIAN ASSISTANT

## 2020-11-05 PROCEDURE — 84145 PROCALCITONIN (PCT): CPT

## 2020-11-05 PROCEDURE — 6360000002 HC RX W HCPCS: Performed by: PHYSICIAN ASSISTANT

## 2020-11-05 PROCEDURE — 94640 AIRWAY INHALATION TREATMENT: CPT

## 2020-11-05 RX ADMIN — SODIUM CHLORIDE, PRESERVATIVE FREE 10 ML: 5 INJECTION INTRAVENOUS at 08:28

## 2020-11-05 RX ADMIN — DEXAMETHASONE SODIUM PHOSPHATE 6 MG: 4 INJECTION, SOLUTION INTRA-ARTICULAR; INTRALESIONAL; INTRAMUSCULAR; INTRAVENOUS; SOFT TISSUE at 08:25

## 2020-11-05 RX ADMIN — FLUTICASONE PROPIONATE 2 SPRAY: 50 SPRAY, METERED NASAL at 20:18

## 2020-11-05 RX ADMIN — ENOXAPARIN SODIUM 40 MG: 40 INJECTION SUBCUTANEOUS at 20:18

## 2020-11-05 RX ADMIN — MONTELUKAST SODIUM 10 MG: 10 TABLET ORAL at 20:18

## 2020-11-05 RX ADMIN — POTASSIUM CHLORIDE 10 MEQ: 7.46 INJECTION, SOLUTION INTRAVENOUS at 09:40

## 2020-11-05 RX ADMIN — BUDESONIDE AND FORMOTEROL FUMARATE DIHYDRATE 2 PUFF: 160; 4.5 AEROSOL RESPIRATORY (INHALATION) at 20:34

## 2020-11-05 RX ADMIN — ATENOLOL 50 MG: 50 TABLET ORAL at 08:26

## 2020-11-05 RX ADMIN — POTASSIUM CHLORIDE 10 MEQ: 7.46 INJECTION, SOLUTION INTRAVENOUS at 05:57

## 2020-11-05 RX ADMIN — OXYCODONE HYDROCHLORIDE AND ACETAMINOPHEN 500 MG: 500 TABLET ORAL at 08:27

## 2020-11-05 RX ADMIN — ALBUTEROL SULFATE 6 PUFF: 90 AEROSOL, METERED RESPIRATORY (INHALATION) at 08:24

## 2020-11-05 RX ADMIN — FLUTICASONE PROPIONATE 2 SPRAY: 50 SPRAY, METERED NASAL at 08:28

## 2020-11-05 RX ADMIN — POTASSIUM CHLORIDE 10 MEQ: 7.46 INJECTION, SOLUTION INTRAVENOUS at 07:13

## 2020-11-05 RX ADMIN — HYDROCHLOROTHIAZIDE 12.5 MG: 25 TABLET ORAL at 08:24

## 2020-11-05 RX ADMIN — ASPIRIN 81 MG: 81 TABLET, CHEWABLE ORAL at 08:24

## 2020-11-05 RX ADMIN — Medication 50 MG: at 08:24

## 2020-11-05 RX ADMIN — ROSUVASTATIN CALCIUM 10 MG: 10 TABLET, FILM COATED ORAL at 08:26

## 2020-11-05 RX ADMIN — POTASSIUM CHLORIDE 10 MEQ: 7.46 INJECTION, SOLUTION INTRAVENOUS at 08:23

## 2020-11-05 RX ADMIN — Medication 1000 UNITS: at 08:26

## 2020-11-05 RX ADMIN — AZITHROMYCIN 250 MG: 250 TABLET, FILM COATED ORAL at 08:24

## 2020-11-05 RX ADMIN — SODIUM CHLORIDE: 9 INJECTION, SOLUTION INTRAVENOUS at 16:45

## 2020-11-05 RX ADMIN — POTASSIUM CHLORIDE 10 MEQ: 7.46 INJECTION, SOLUTION INTRAVENOUS at 10:59

## 2020-11-05 RX ADMIN — ENOXAPARIN SODIUM 40 MG: 40 INJECTION SUBCUTANEOUS at 08:24

## 2020-11-05 ASSESSMENT — PAIN SCALES - GENERAL: PAINLEVEL_OUTOF10: 0

## 2020-11-05 NOTE — PROGRESS NOTES
Called and updated family, Thomas Hearn on plan of care and patient condition. No questions or concerns at this time.

## 2020-11-05 NOTE — PROGRESS NOTES
Hospitalist Progress Note      Patient:  Louie Willis    Unit/Bed:8B-34/034-A  YOB: 1950  MRN: [id number]   Acct: [de-identified]   PCP: Charleen Walker MD  Date of Admission: 11/3/2020    Assessment and Plan:        1. COVID-19 virus infection: Treatment as below. 2. Acute hypoxic respiratory failure due to COVID 19/possible atypical bacterial pneumonia: initially hypoxic requiring 2L O2, O2 titration protocol, albuterol inhaler for shortness of breath, vitamin C, vitamin D, zinc, ASA, Lovenox BID, dexamethasone, pharmacy to dose remdesivir, convalescent plasma --> consent signed in the ED  11/4/2020: Continue vitamin C, vitamin D, zinc, Lovenox twice daily, dexamethasone, remdesivir, I will add Flonase nasal spray. I will add azithromycin for 5 days for possible atypical pneumonia. Continue wean off oxygen as tolerated. 11/5/2020: Improving, did not reach goal for discharge, currently on room air satting 96%. No fever in the past 24 hours. 3. Possible atypical pneumonia: Procalcitonin elevated, repeat tomorrow. As mentioned above  4. Essential hypertension: continue home medications  5. Hypokalemia: replacement protocol, follow with labs, possibly due to diarrhea  11/5/2020: Still having hypokalemia, continue potassium replacement to prevent seizures. PMH, PSH, SH, FHX reviewed in chart review snap shot in EPIC    CC: COVID-19 virus infection, acute hypoxic respiratory failure secondary to COVID-19 virus infection. HPI: Initial admission HPI by admitting physician reviewed as below:  Patient presents to the ED with a 5 day history of COVID 19 infection. Patient reports exposure to COVID within her family. She tested positive 5 days ago in her PCP office after she was having increased fatigue. She has developed shortness of breath worse with exertion over the past 2 days.   The patient also endorses loss of taste and smell, anorexia, fatigue, myalgias and arthralgias, nausea, and  a few episodes of diarrhea. Patient was found to be hypoxic with activity and admitted for oxygen therapy and more aggressive treatment of her COVID 19 infection. For further details and updates please refer to assessment and plan at the beginning of the note. ROS (10 point review of systems completed. Pertinent positives noted. Otherwise ROS is negative) : Shortness of breath  PMH:  Per HPI and reviewed in the chart  SHX: Reviewed in the chart, also the patient  FHX: Reviewed in the chart, also with the patient  Allergies: Reviewed in the chart  Medications:     sodium chloride 75 mL/hr at 11/04/20 2355      sodium chloride flush  10 mL Intravenous 2 times per day    enoxaparin  40 mg Subcutaneous BID    fluticasone  2 spray Each Nostril BID    azithromycin  250 mg Oral Daily    atenolol  50 mg Oral Daily    [Held by provider] cetirizine  10 mg Oral Daily    hydroCHLOROthiazide  12.5 mg Oral Daily    budesonide-formoterol  2 puff Inhalation BID    montelukast  10 mg Oral Nightly    rosuvastatin  10 mg Oral Daily    zinc sulfate  50 mg Oral Daily    vitamin C  500 mg Oral Daily    Vitamin D  1,000 Units Oral Daily    aspirin  81 mg Oral Daily    dexamethasone  6 mg Intravenous Daily    remdesivir IVPB  100 mg Intravenous Q24H   Subjective 11/4/2020: Still using 4 L nasal cannula oxygen, currently on remdesivir and dexamethasone in addition to multivitamins as mentioned in assessment and plan, I will add Flonase nasal spray. Procalcitonin elevated, I will add azithromycin for total 5 days for ultimate management and possible bacterial pneumonia in addition to viral pneumonia. 11/5/2020: Improving, currently on room air, continue same management, continue potassium replacement, monitor closely.   Vital Signs:   Vitals reviewed and compared with the previous ones  /68   Pulse 61   Temp 98.2 °F (36.8 °C) (Oral)   Resp 16   Ht 4' 11.5\" (1.511 m)   Wt 165 lb (74.8 kg)   SpO2 94%   BMI 32.77 kg/m²      Intake/Output Summary (Last 24 hours) at 11/5/2020 0757  Last data filed at 11/5/2020 0357  Gross per 24 hour   Intake 2873.7 ml   Output --   Net 2873.7 ml        General:   Age-appropriate, in no acute distress  HEENT:  normocephalic and atraumatic. No scleral icterus. PERRLA. Neck: supple. No thyromegaly. Lungs: Decreased breath sounds bilaterally, scattered crackles bilaterally. Cardiac: S1, S2, RRR, no JVD. Abdomen: soft. Increased abdominal girth, nontender. Bowel sounds positive. Extremities:  No clubbing, cyanosis, or edema in all extremities. Vasculature: capillary refill < 3 seconds. Pedal pulses intact bilaterally. Skin:  warm and dry. Not clammy. Psych:  Alert to time, person and place. Communicable. Affect appropriate  Lymph:  No supraclavicular ,and no anterior cervical lymphadenopathy. Neurologic:  No focal deficit. CN II-XII grossly intact. Motor and Sensory capacity intact in all extremities. Labs:   Recent Labs     11/03/20 1850 11/05/20  0416   WBC 4.6* 3.8*   HGB 15.8 13.1   HCT 45.3 39.4    137     Recent Labs     11/03/20 1850 11/05/20  0416   * 137   K 2.9* 2.6*   CL 88* 101   CO2 29 24   BUN 18 10   CREATININE 0.8 0.6   CALCIUM 9.6 8.5     Recent Labs     11/03/20 1850 11/05/20  0416   AST 71* 50*   ALT 55 44   BILIDIR  --  <0.2   BILITOT 0.7 0.5   ALKPHOS 78 67     No results for input(s): INR in the last 72 hours. No results for input(s): Jadene Moh in the last 72 hours.     Microbiology:    Blood culture #1:   Lab Results   Component Value Date    BC No growth-preliminary  11/03/2020    BC No growth-preliminary  11/03/2020       Blood culture #2:No results found for: Ana Mendez    Organism:No results found for: ORG    No results found for: LABGRAM    MRSA culture only:No results found for: [facility]    Urine culture: No results found for: LABURIN    Respiratory culture: No results found for: CULTRESP    Aerobic and Anaerobic :  No results found for: LABAERO  No results found for: LABANAE    Urinalysis:    No results found for: Kathy Dose, BACTERIA, RBCUA, BLOODU, [address], [place] [place] [address]    Radiology:  XR CHEST PORTABLE   Final Result   No acute findings               **This report has been created using voice recognition software. It may contain minor errors which are inherent in voice recognition technology. **      Final report electronically signed by Dr. Veena Gaytan on 11/3/2020 7:46 PM        Xr Chest Portable    Result Date: 11/3/2020  PROCEDURE: XR CHEST PORTABLE CLINICAL INFORMATION: sob; COVID +. COMPARISON: February 17, 2012 TECHNIQUE: Portable chest. FINDINGS: No discrete lobar consolidation. Costophrenic angles are preserved. Cardiomediastinal silhouette is within normal limits. Ectatic thoracic aorta. No acute osseous findings. No acute findings **This report has been created using voice recognition software. It may contain minor errors which are inherent in voice recognition technology. ** Final report electronically signed by Dr. Veena Gaytan on 11/3/2020 7:46 PM      Discussed plan with patient and nurse. Patient and nurse verbalized understanding and agree. All questions addressed with concerns. Please excuse my TYPOS!     Electronically signed by Cristina Holstein, MD on 11/5/2020 at 7:57 AM

## 2020-11-05 NOTE — FLOWSHEET NOTE
[facility] 88 PROGRESS NOTE      Patient: Huseyin Lynn  Room #: 7F-59/488-B            YOB: 1950  Age: 69 y.o. Gender: female            Admit Date & Time: 11/3/2020  6:15 PM    Assessment:  Aubrey Rubio is  a 69year old female who is on the [facility] unit of . She answered the phone when this  called her room. She sounded like she was having difficulty talking but stated, \"a bit of a set back, but I'm doing OK. \"  And explained she did not feel so good in the morning. Interventions:  Aubrey Rubio belongs to [employer]. Her  has called her and she is pleased for all the prayers going up for her healing  Outcomes:  She was encouraged by the phone call    Plan: 1. Care Plan:  Continue spiritual and emotional care for patient and family. Including prayers.      Electronically signed by Earnestine Javier on 11/5/2020 at 2:07 PM.  [facility]  [phone]

## 2020-11-05 NOTE — CARE COORDINATION
11/5/20, 12:42 PM EST    DISCHARGE ON GOING EVALUATION    Raul Stevens day: 2  Location: [address] [place] [address] Reason for admit: Acute respiratory failure due to COVID-19 ([facility] [U07.1, J96.00]      Treatment Plan of Care: remains on room air, K+ 2.6,  IV K+ replacement, oral antibiotic Zithromax, IVF, Decadron, Lovenox, Vit C, Vit D, Zinc.  Barriers to Discharge: not med ready  PCP: Lauren Nance MD  Readmission Risk Score: 10%  Patient Goals/Plan/Treatment Preferences: planning home alone with family support; on room air currently.

## 2020-11-06 VITALS
RESPIRATION RATE: 18 BRPM | TEMPERATURE: 97.7 F | DIASTOLIC BLOOD PRESSURE: 89 MMHG | SYSTOLIC BLOOD PRESSURE: 132 MMHG | HEIGHT: 60 IN | HEART RATE: 69 BPM | WEIGHT: 167.8 LBS | OXYGEN SATURATION: 96 % | BODY MASS INDEX: 32.94 KG/M2

## 2020-11-06 LAB
ALBUMIN SERPL-MCNC: 3.1 G/DL (ref 3.5–5.1)
ALP BLD-CCNC: 62 U/L (ref 38–126)
ALT SERPL-CCNC: 38 U/L (ref 11–66)
ANION GAP SERPL CALCULATED.3IONS-SCNC: 12 MEQ/L (ref 8–16)
AST SERPL-CCNC: 39 U/L (ref 5–40)
BASOPHILS # BLD: 0 %
BASOPHILS ABSOLUTE: 0 THOU/MM3 (ref 0–0.1)
BILIRUB SERPL-MCNC: 0.5 MG/DL (ref 0.3–1.2)
BILIRUBIN DIRECT: < 0.2 MG/DL (ref 0–0.3)
BUN BLDV-MCNC: 11 MG/DL (ref 7–22)
CALCIUM SERPL-MCNC: 8.6 MG/DL (ref 8.5–10.5)
CHLORIDE BLD-SCNC: 103 MEQ/L (ref 98–111)
CO2: 25 MEQ/L (ref 23–33)
CREAT SERPL-MCNC: 0.5 MG/DL (ref 0.4–1.2)
EOSINOPHIL # BLD: 0 %
EOSINOPHILS ABSOLUTE: 0 THOU/MM3 (ref 0–0.4)
ERYTHROCYTE [DISTWIDTH] IN BLOOD BY AUTOMATED COUNT: 12.3 % (ref 11.5–14.5)
ERYTHROCYTE [DISTWIDTH] IN BLOOD BY AUTOMATED COUNT: 40.6 FL (ref 35–45)
GFR SERPL CREATININE-BSD FRML MDRD: > 90 ML/MIN/1.73M2
GLUCOSE BLD-MCNC: 112 MG/DL (ref 70–108)
HCT VFR BLD CALC: 40.3 % (ref 37–47)
HEMOGLOBIN: 13.9 GM/DL (ref 12–16)
IMMATURE GRANS (ABS): 0.02 THOU/MM3 (ref 0–0.07)
IMMATURE GRANULOCYTES: 0.4 %
LYMPHOCYTES # BLD: 19.1 %
LYMPHOCYTES ABSOLUTE: 1.1 THOU/MM3 (ref 1–4.8)
MAGNESIUM: 1.8 MG/DL (ref 1.6–2.4)
MCH RBC QN AUTO: 31.4 PG (ref 26–33)
MCHC RBC AUTO-ENTMCNC: 34.5 GM/DL (ref 32.2–35.5)
MCV RBC AUTO: 91 FL (ref 81–99)
MONOCYTES # BLD: 10.1 %
MONOCYTES ABSOLUTE: 0.6 THOU/MM3 (ref 0.4–1.3)
NUCLEATED RED BLOOD CELLS: 0 /100 WBC
PLATELET # BLD: 176 THOU/MM3 (ref 130–400)
PMV BLD AUTO: 10.4 FL (ref 9.4–12.4)
POTASSIUM REFLEX MAGNESIUM: 3 MEQ/L (ref 3.5–5.2)
PROCALCITONIN: 0.21 NG/ML (ref 0.01–0.09)
RBC # BLD: 4.43 MILL/MM3 (ref 4.2–5.4)
SEG NEUTROPHILS: 70.4 %
SEGMENTED NEUTROPHILS ABSOLUTE COUNT: 3.9 THOU/MM3 (ref 1.8–7.7)
SODIUM BLD-SCNC: 140 MEQ/L (ref 135–145)
TOTAL PROTEIN: 5.7 G/DL (ref 6.1–8)
WBC # BLD: 5.5 THOU/MM3 (ref 4.8–10.8)

## 2020-11-06 PROCEDURE — 6360000002 HC RX W HCPCS: Performed by: PHYSICIAN ASSISTANT

## 2020-11-06 PROCEDURE — 94761 N-INVAS EAR/PLS OXIMETRY MLT: CPT

## 2020-11-06 PROCEDURE — 6370000000 HC RX 637 (ALT 250 FOR IP): Performed by: PHYSICIAN ASSISTANT

## 2020-11-06 PROCEDURE — 2580000003 HC RX 258: Performed by: PHYSICIAN ASSISTANT

## 2020-11-06 PROCEDURE — 84145 PROCALCITONIN (PCT): CPT

## 2020-11-06 PROCEDURE — 80048 BASIC METABOLIC PNL TOTAL CA: CPT

## 2020-11-06 PROCEDURE — 6370000000 HC RX 637 (ALT 250 FOR IP): Performed by: FAMILY MEDICINE

## 2020-11-06 PROCEDURE — 36415 COLL VENOUS BLD VENIPUNCTURE: CPT

## 2020-11-06 PROCEDURE — 80076 HEPATIC FUNCTION PANEL: CPT

## 2020-11-06 PROCEDURE — 2580000003 HC RX 258: Performed by: FAMILY MEDICINE

## 2020-11-06 PROCEDURE — 83735 ASSAY OF MAGNESIUM: CPT

## 2020-11-06 PROCEDURE — 99239 HOSP IP/OBS DSCHRG MGMT >30: CPT | Performed by: FAMILY MEDICINE

## 2020-11-06 PROCEDURE — 2500000003 HC RX 250 WO HCPCS: Performed by: PHYSICIAN ASSISTANT

## 2020-11-06 PROCEDURE — 85025 COMPLETE CBC W/AUTO DIFF WBC: CPT

## 2020-11-06 RX ORDER — FLUTICASONE PROPIONATE 50 MCG
2 SPRAY, SUSPENSION (ML) NASAL 2 TIMES DAILY
Qty: 1 BOTTLE | Refills: 0 | Status: SHIPPED | OUTPATIENT
Start: 2020-11-06

## 2020-11-06 RX ORDER — ASPIRIN 81 MG/1
81 TABLET, CHEWABLE ORAL DAILY
Qty: 30 TABLET | Refills: 0 | Status: SHIPPED | OUTPATIENT
Start: 2020-11-07

## 2020-11-06 RX ORDER — POTASSIUM CHLORIDE 20 MEQ/1
40 TABLET, EXTENDED RELEASE ORAL ONCE
Status: COMPLETED | OUTPATIENT
Start: 2020-11-06 | End: 2020-11-06

## 2020-11-06 RX ORDER — ZINC SULFATE 50(220)MG
50 CAPSULE ORAL DAILY
Qty: 15 CAPSULE | Refills: 0 | COMMUNITY
Start: 2020-11-07

## 2020-11-06 RX ORDER — AZITHROMYCIN 250 MG/1
250 TABLET, FILM COATED ORAL DAILY
Qty: 3 TABLET | Refills: 0 | Status: SHIPPED | OUTPATIENT
Start: 2020-11-07 | End: 2020-11-10

## 2020-11-06 RX ORDER — POTASSIUM CHLORIDE 20 MEQ/1
20 TABLET, EXTENDED RELEASE ORAL DAILY
Qty: 5 TABLET | Refills: 0 | Status: SHIPPED | OUTPATIENT
Start: 2020-11-07

## 2020-11-06 RX ORDER — DEXAMETHASONE 6 MG/1
6 TABLET ORAL DAILY
Qty: 2 TABLET | Refills: 0 | Status: SHIPPED | OUTPATIENT
Start: 2020-11-07 | End: 2020-11-09

## 2020-11-06 RX ORDER — CHOLECALCIFEROL (VITAMIN D3) 25 MCG
1000 TABLET ORAL DAILY
Qty: 30 TABLET | Refills: 0 | Status: SHIPPED | OUTPATIENT
Start: 2020-11-07

## 2020-11-06 RX ADMIN — Medication 1000 UNITS: at 08:32

## 2020-11-06 RX ADMIN — POTASSIUM CHLORIDE 10 MEQ: 7.46 INJECTION, SOLUTION INTRAVENOUS at 07:44

## 2020-11-06 RX ADMIN — OXYCODONE HYDROCHLORIDE AND ACETAMINOPHEN 500 MG: 500 TABLET ORAL at 08:32

## 2020-11-06 RX ADMIN — DEXAMETHASONE SODIUM PHOSPHATE 6 MG: 4 INJECTION, SOLUTION INTRA-ARTICULAR; INTRALESIONAL; INTRAMUSCULAR; INTRAVENOUS; SOFT TISSUE at 08:33

## 2020-11-06 RX ADMIN — ATENOLOL 50 MG: 50 TABLET ORAL at 08:32

## 2020-11-06 RX ADMIN — ROSUVASTATIN CALCIUM 10 MG: 10 TABLET, FILM COATED ORAL at 08:32

## 2020-11-06 RX ADMIN — REMDESIVIR 100 MG: 100 INJECTION, POWDER, LYOPHILIZED, FOR SOLUTION INTRAVENOUS at 00:39

## 2020-11-06 RX ADMIN — POTASSIUM CHLORIDE 10 MEQ: 7.46 INJECTION, SOLUTION INTRAVENOUS at 08:34

## 2020-11-06 RX ADMIN — ASPIRIN 81 MG: 81 TABLET, CHEWABLE ORAL at 08:33

## 2020-11-06 RX ADMIN — AZITHROMYCIN 250 MG: 250 TABLET, FILM COATED ORAL at 08:32

## 2020-11-06 RX ADMIN — POTASSIUM CHLORIDE 10 MEQ: 7.46 INJECTION, SOLUTION INTRAVENOUS at 09:49

## 2020-11-06 RX ADMIN — Medication 50 MG: at 08:32

## 2020-11-06 RX ADMIN — SODIUM CHLORIDE: 9 INJECTION, SOLUTION INTRAVENOUS at 08:34

## 2020-11-06 RX ADMIN — SODIUM CHLORIDE, PRESERVATIVE FREE 10 ML: 5 INJECTION INTRAVENOUS at 08:34

## 2020-11-06 RX ADMIN — HYDROCHLOROTHIAZIDE 12.5 MG: 25 TABLET ORAL at 08:35

## 2020-11-06 RX ADMIN — POTASSIUM CHLORIDE 40 MEQ: 1500 TABLET, EXTENDED RELEASE ORAL at 11:17

## 2020-11-06 RX ADMIN — ENOXAPARIN SODIUM 40 MG: 40 INJECTION SUBCUTANEOUS at 08:33

## 2020-11-06 RX ADMIN — POTASSIUM CHLORIDE 10 MEQ: 7.46 INJECTION, SOLUTION INTRAVENOUS at 06:14

## 2020-11-06 ASSESSMENT — PAIN SCALES - GENERAL: PAINLEVEL_OUTOF10: 0

## 2020-11-06 NOTE — DISCHARGE SUMMARY
Hospitalist Discharge Summary        Patient: Aniyah Garcia  YOB: 1950  MRN: [id number]   Acct: [de-identified]    Primary Care Physician: Soren Castaneda MD    Admit date  11/3/2020    Discharge date:  11/6/2020 2:43 PM  Discharge Diagnoses: Active Hospital Problems    Diagnosis Date Noted    Acute respiratory failure due to COVID-19 ([facility] [U07.1, J96.00] 11/03/2020       Code Status:  Full Code     Chief Complaint on presentation :- fatigue      Initial admission HPI as below:-  Patient presents to the ED with a 5 day history of COVID 19 infection.  Patient reports exposure to COVID within her family. Ines Valenzuela tested positive 5 days ago in her PCP office after she was having increased fatigue.  She has developed shortness of breath worse with exertion over the past 2 days.  The patient also endorses loss of taste and smell, anorexia, fatigue, myalgias and arthralgias, nausea, and  a few episodes of diarrhea. Patient was found to be hypoxic with activity and admitted for oxygen therapy and more aggressive treatment of her COVID 19 infection. Hospital problem list with assessment and plan updates:-  1. COVID-19 virus infection: Treatment as below. 2. Acute hypoxic respiratory failure due to COVID 19/possible atypical bacterial pneumonia: initially hypoxic requiring 2L O2, O2 titration protocol, albuterol inhaler for shortness of breath, vitamin C, vitamin D, zinc, ASA, Lovenox BID, dexamethasone, pharmacy to dose remdesivir, convalescent plasma --> consent signed in the ED  11/4/2020: Continue vitamin C, vitamin D, zinc, Lovenox twice daily, dexamethasone, remdesivir, I will add Flonase nasal spray. I will add azithromycin for 5 days for possible atypical pneumonia. Continue wean off oxygen as tolerated. 11/5/2020: Improving, did not reach goal for discharge, currently on room air satting 96%. No fever in the past 24 hours.   3. Possible atypical pneumonia: Procalcitonin elevated, repeat tomorrow. As mentioned above  4. Essential hypertension: continue home medications  5. Hypokalemia: replacement protocol, follow with labs, possibly due to diarrhea  11/5/2020: Still having hypokalemia, continue potassium replacement to prevent seizures. Additional discharge final recommendations as below:-  At the day of discharge patient was tolerating orally very well, urinating well, with good bowel movements, patient continued to be on room air not requiring oxygen in the past 24 hours, we evaluated the patient for the need of home O2 as she was not qualified because she kept her oxygen up while resting and walking. We prescribed 3 more days of azithromycin 250 mg daily starting from the day after discharge, also will prescribe 2 more days of Decadron 6 mg orally starting from the day of discharge, Flonase nasal spray, zinc, vitamin D, resume her home medications. Potassium 20 mEq for few more days only, encouraged to eat banana to prevent hypokalemia. Encouraged to return to our ER if she develop severe shortness of breath or chest pain or high fever or any warning symptoms discussed with the patient. Respiratory therapist (Michaela Aguirre RCP ) note as below:  Patient was placed on room air for 60 minutes. SpO2 was 95 % on room air at rest. Patients SpO2 was 89% or above and did not qualify for home oxygen. Patient was walked for 6 minutes. SpO2 was 95 % during walking. Patients SpO2 was 89% or above and did not qualify for home oxygen. Patient does not have a positive pressure airway device at home. Patient is not  diagnosed with Obstructive Sleep Apnea. Patient will not be set up/instructed on a nocturnal study. Results will be given to qualified provider. Checked with patients RN no nocturnal wanted      All appointments obtained for the patient at the day of discharge with other specialities including PCP in one week. All questions and concerns addressed.   Patient verbalized understandings and was agreeable with discharge planning. Physical Exam:-  Vitals:   Patient Vitals for the past 24 hrs:   BP Temp Temp src Pulse Resp SpO2 Weight   11/06/20 1116 132/89 97.7 °F (36.5 °C) Oral 69 18 96 % --   11/06/20 0951 (!) 167/82 -- -- -- -- -- --   11/06/20 0824 (!) 176/78 97.3 °F (36.3 °C) Oral 63 18 95 % --   11/06/20 0400 139/65 98.9 °F (37.2 °C) Oral 63 16 95 % 167 lb 12.8 oz (76.1 kg)   11/05/20 2348 (!) 143/65 98.2 °F (36.8 °C) Oral 60 16 94 % --   11/05/20 2000 (!) 145/81 98 °F (36.7 °C) Oral 64 14 98 % --   11/05/20 1525 132/78 98 °F (36.7 °C) Oral 69 12 97 % --     Weight:   Weight: 167 lb 12.8 oz (76.1 kg)   24 hour intake/output:     Intake/Output Summary (Last 24 hours) at 11/6/2020 1443  Last data filed at 11/6/2020 1417  Gross per 24 hour   Intake 4580 ml   Output 0 ml   Net 4580 ml       1. General appearance: No apparent distress, appears stated age and cooperative. 2. HEENT: Normal cephalic, atraumatic without obvious deformity. Pupils equal, round, and reactive to light. Extra ocular muscles intact. Conjunctivae/corneas clear. 3. Neck: Supple, with full range of motion. No jugular venous distention. Trachea midline. 4. Respiratory:  Normal respiratory effort. Clear to auscultation, bilaterally without Rales/Wheezes/Rhonchi. 5. Cardiovascular: Regular rate and rhythm with normal S1/S2 without murmurs, rubs or gallops. 6. Abdomen: Soft, non-tender, non-distended with normal bowel sounds. 7. Musculoskeletal:  No clubbing, cyanosis or edema bilaterally. 8. Skin: Skin color, texture, turgor normal.  No rashes or lesions. 9. Neurologic:  Neurovascularly intact without any focal sensory/motor deficits. Cranial nerves: II-XII intact, grossly non-focal.  10. Psychiatric: Alert and oriented, thought content appropriate, normal insight  11. Capillary Refill: Brisk,< 3 seconds   12.  Peripheral Pulses: +2 palpable, equal bilaterally       Discharge Medications:-      Medication List      START taking these medications    aspirin 81 MG chewable tablet  Take 1 tablet by mouth daily  Start taking on:  November 7, 2020     azithromycin 250 MG tablet  Commonly known as:  ZITHROMAX  Take 1 tablet by mouth daily for 3 days  Start taking on:  November 7, 2020     dexamethasone 6 MG tablet  Commonly known as:  Decadron  Take 1 tablet by mouth daily for 2 days  Start taking on:  November 7, 2020     fluticasone 50 MCG/ACT nasal spray  Commonly known as:  FLONASE  2 sprays by Each Nostril route 2 times daily     potassium chloride 20 MEQ extended release tablet  Commonly known as:  KLOR-CON M  Take 1 tablet by mouth daily  Start taking on:  November 7, 2020     Vitamin D3 25 MCG Tabs  Take 1 tablet by mouth daily  Start taking on:  November 7, 2020     zinc sulfate 220 (50 Zn) MG capsule  Commonly known as:  ZINCATE  Take 1 capsule by mouth daily  Start taking on:  November 7, 2020        CONTINUE taking these medications    atenolol 50 MG tablet  Commonly known as:  TENORMIN     Centrum Silver Tabs     Crestor 10 MG tablet  Generic drug:  rosuvastatin     Dulera 100-5 MCG/ACT inhaler  Generic drug:  mometasone-formoterol     hydroCHLOROthiazide 12.5 MG tablet  Commonly known as:  HYDRODIURIL     ibuprofen 200 MG tablet  Commonly known as:  ADVIL;MOTRIN     montelukast 10 MG tablet  Commonly known as:  SINGULAIR     NASACORT AQ NA     ProAir  (90 Base) MCG/ACT inhaler  Generic drug:  albuterol sulfate HFA        STOP taking these medications    cetirizine 10 MG tablet  Commonly known as:  ZYRTEC           Where to Get Your Medications      [facility] [address] [place] [address] [place] [address]  [address] [place] [address]    Phone:  [phone]   · aspirin 81 MG chewable tablet  · azithromycin 250 MG tablet  · dexamethasone 6 MG tablet  · fluticasone 50 MCG/ACT nasal spray  · potassium chloride 20 MEQ extended release tablet  · Vitamin D3 25 MCG Tabs     You can get these medications from any pharmacy    You don't need a prescription for these medications  · zinc sulfate 220 (50 Zn) MG capsule          Labs :-  Recent Results (from the past 72 hour(s))   EKG SOB    Collection Time: 11/03/20  6:34 PM   Result Value Ref Range    Ventricular Rate 82 BPM    Atrial Rate 82 BPM    P-R Interval 144 ms    QRS Duration 92 ms    Q-T Interval 370 ms    QTc Calculation (Bazett) 432 ms    P Axis 61 degrees    R Axis 68 degrees    T Axis 56 degrees   CBC auto differential    Collection Time: 11/03/20  6:50 PM   Result Value Ref Range    WBC 4.6 (L) 4.8 - 10.8 thou/mm3    RBC 4.99 4.20 - 5.40 mill/mm3    Hemoglobin 15.8 12.0 - 16.0 gm/dl    Hematocrit 45.3 37.0 - 47.0 %    MCV 90.8 81.0 - 99.0 fL    MCH 31.7 26.0 - 33.0 pg    MCHC 34.9 32.2 - 35.5 gm/dl    RDW-CV 12.2 11.5 - 14.5 %    RDW-SD 40.7 35.0 - 45.0 fL    Platelets 993 316 - 384 thou/mm3    MPV 10.2 9.4 - 12.4 fL    Seg Neutrophils 75.2 %    Lymphocytes 16.0 %    Monocytes 8.2 %    Eosinophils 0.0 %    Basophils 0.2 %    Immature Granulocytes 0.4 %    Segs Absolute 3.5 1.8 - 7.7 thou/mm3    Lymphocytes Absolute 0.7 (L) 1.0 - 4.8 thou/mm3    Monocytes Absolute 0.4 0.4 - 1.3 thou/mm3    Eosinophils Absolute 0.0 0.0 - 0.4 thou/mm3    Basophils Absolute 0.0 0.0 - 0.1 thou/mm3    Immature Grans (Abs) 0.02 0.00 - 0.07 thou/mm3    nRBC 0 /100 wbc   Comprehensive Metabolic Panel    Collection Time: 11/03/20  6:50 PM   Result Value Ref Range    Glucose 151 (H) 70 - 108 mg/dL    CREATININE 0.8 0.4 - 1.2 mg/dL    BUN 18 7 - 22 mg/dL    Sodium 131 (L) 135 - 145 meq/L    Potassium 2.9 (L) 3.5 - 5.2 meq/L    Chloride 88 (L) 98 - 111 meq/L    CO2 29 23 - 33 meq/L    Calcium 9.6 8.5 - 10.5 mg/dL    AST 71 (H) 5 - 40 U/L    Alkaline Phosphatase 78 38 - 126 U/L    Total Protein 7.4 6.1 - 8.0 g/dL    Alb 4.1 3.5 - 5.1 g/dL    Total Bilirubin 0.7 0.3 - 1.2 mg/dL    ALT 55 11 - 66 U/L   Troponin Collection Time: 11/03/20  6:50 PM   Result Value Ref Range    Troponin T < 0.010 ng/ml   C-reactive protein    Collection Time: 11/03/20  6:50 PM   Result Value Ref Range    CRP 5.23 (H) 0.00 - 1.00 mg/dl   Ferritin    Collection Time: 11/03/20  6:50 PM   Result Value Ref Range    Ferritin 711 (H) 10 - 291 ng/mL   Lactate Dehydrogenase    Collection Time: 11/03/20  6:50 PM   Result Value Ref Range     (H) 100 - 190 U/L   Procalcitonin    Collection Time: 11/03/20  6:50 PM   Result Value Ref Range    Procalcitonin 0.24 (H) 0.01 - 0.09 ng/mL   D-dimer, quantitative    Collection Time: 11/03/20  6:50 PM   Result Value Ref Range    D-Dimer, Quant 627.00 (H) 0.00 - 500.00 ng/ml FEU   Anion Gap    Collection Time: 11/03/20  6:50 PM   Result Value Ref Range    Anion Gap 14.0 8.0 - 16.0 meq/L   Glomerular Filtration Rate, Estimated    Collection Time: 11/03/20  6:50 PM   Result Value Ref Range    Est, Glom Filt Rate 71 (A) ml/min/1.73m2   Osmolality    Collection Time: 11/03/20  6:50 PM   Result Value Ref Range    Osmolality Calc 267.5 (L) 275.0 - 300.0 mOsmol/kg   Lactate, Sepsis    Collection Time: 11/03/20  7:45 PM   Result Value Ref Range    Lactic Acid, Sepsis 1.4 0.5 - 1.9 mmol/L   Culture, Blood 1    Collection Time: 11/03/20  7:45 PM    Specimen: Blood   Result Value Ref Range    Blood Culture, Routine No growth-preliminary     Culture, Blood 2    Collection Time: 11/03/20  7:45 PM    Specimen: Blood   Result Value Ref Range    Blood Culture, Routine No growth-preliminary     ABO/RH    Collection Time: 11/03/20 11:35 PM   Result Value Ref Range    ABO O     Rh Factor POS    Transfusion Reaction (Allergic)    Collection Time: 11/03/20 11:35 PM   Result Value Ref Range    Transfusion Reaction Report     Hepatic Function Panel    Collection Time: 11/05/20  4:16 AM   Result Value Ref Range    Alb 3.0 (L) 3.5 - 5.1 g/dL    Total Bilirubin 0.5 0.3 - 1.2 mg/dL    Bilirubin, Direct <0.2 0.0 - 0.3 mg/dL    Alkaline Phosphatase 67 38 - 126 U/L    AST 50 (H) 5 - 40 U/L    ALT 44 11 - 66 U/L    Total Protein 5.6 (L) 6.1 - 8.0 g/dL   Procalcitonin    Collection Time: 11/05/20  4:16 AM   Result Value Ref Range    Procalcitonin 0.29 (H) 0.01 - 0.09 ng/mL   Basic Metabolic Panel w/ Reflex to MG    Collection Time: 11/05/20  4:16 AM   Result Value Ref Range    Sodium 137 135 - 145 meq/L    Potassium reflex Magnesium 2.6 (LL) 3.5 - 5.2 meq/L    Chloride 101 98 - 111 meq/L    CO2 24 23 - 33 meq/L    Glucose 123 (H) 70 - 108 mg/dL    BUN 10 7 - 22 mg/dL    CREATININE 0.6 0.4 - 1.2 mg/dL    Calcium 8.5 8.5 - 10.5 mg/dL   CBC auto differential    Collection Time: 11/05/20  4:16 AM   Result Value Ref Range    WBC 3.8 (L) 4.8 - 10.8 thou/mm3    RBC 4.27 4.20 - 5.40 mill/mm3    Hemoglobin 13.1 12.0 - 16.0 gm/dl    Hematocrit 39.4 37.0 - 47.0 %    MCV 92.3 81.0 - 99.0 fL    MCH 30.7 26.0 - 33.0 pg    MCHC 33.2 32.2 - 35.5 gm/dl    RDW-CV 12.2 11.5 - 14.5 %    RDW-SD 40.9 35.0 - 45.0 fL    Platelets 711 501 - 982 thou/mm3    MPV 10.5 9.4 - 12.4 fL    Seg Neutrophils 68.4 %    Lymphocytes 17.5 %    Monocytes 13.3 %    Eosinophils 0.0 %    Basophils 0.3 %    Immature Granulocytes 0.5 %    Segs Absolute 2.6 1.8 - 7.7 thou/mm3    Lymphocytes Absolute 0.7 (L) 1.0 - 4.8 thou/mm3    Monocytes Absolute 0.5 0.4 - 1.3 thou/mm3    Eosinophils Absolute 0.0 0.0 - 0.4 thou/mm3    Basophils Absolute 0.0 0.0 - 0.1 thou/mm3    Immature Grans (Abs) 0.02 0.00 - 0.07 thou/mm3    nRBC 0 /100 wbc   Anion Gap    Collection Time: 11/05/20  4:16 AM   Result Value Ref Range    Anion Gap 12.0 8.0 - 16.0 meq/L   Glomerular Filtration Rate, Estimated    Collection Time: 11/05/20  4:16 AM   Result Value Ref Range    Est, Glom Filt Rate >90 ml/min/1.73m2   Magnesium    Collection Time: 11/05/20  4:16 AM   Result Value Ref Range    Magnesium 2.0 1.6 - 2.4 mg/dL   Potassium    Collection Time: 11/05/20  2:51 PM   Result Value Ref Range    Potassium 3.6 3.5 - 5.2 meq/L Hepatic Function Panel    Collection Time: 11/06/20  4:33 AM   Result Value Ref Range    Alb 3.1 (L) 3.5 - 5.1 g/dL    Total Bilirubin 0.5 0.3 - 1.2 mg/dL    Bilirubin, Direct <0.2 0.0 - 0.3 mg/dL    Alkaline Phosphatase 62 38 - 126 U/L    AST 39 5 - 40 U/L    ALT 38 11 - 66 U/L    Total Protein 5.7 (L) 6.1 - 8.0 g/dL   Procalcitonin    Collection Time: 11/06/20  4:33 AM   Result Value Ref Range    Procalcitonin 0.21 (H) 0.01 - 0.09 ng/mL   Basic Metabolic Panel w/ Reflex to MG    Collection Time: 11/06/20  4:33 AM   Result Value Ref Range    Sodium 140 135 - 145 meq/L    Potassium reflex Magnesium 3.0 (L) 3.5 - 5.2 meq/L    Chloride 103 98 - 111 meq/L    CO2 25 23 - 33 meq/L    Glucose 112 (H) 70 - 108 mg/dL    BUN 11 7 - 22 mg/dL    CREATININE 0.5 0.4 - 1.2 mg/dL    Calcium 8.6 8.5 - 10.5 mg/dL   CBC auto differential    Collection Time: 11/06/20  4:33 AM   Result Value Ref Range    WBC 5.5 4.8 - 10.8 thou/mm3    RBC 4.43 4.20 - 5.40 mill/mm3    Hemoglobin 13.9 12.0 - 16.0 gm/dl    Hematocrit 40.3 37.0 - 47.0 %    MCV 91.0 81.0 - 99.0 fL    MCH 31.4 26.0 - 33.0 pg    MCHC 34.5 32.2 - 35.5 gm/dl    RDW-CV 12.3 11.5 - 14.5 %    RDW-SD 40.6 35.0 - 45.0 fL    Platelets 408 149 - 686 thou/mm3    MPV 10.4 9.4 - 12.4 fL    Seg Neutrophils 70.4 %    Lymphocytes 19.1 %    Monocytes 10.1 %    Eosinophils 0.0 %    Basophils 0.0 %    Immature Granulocytes 0.4 %    Segs Absolute 3.9 1.8 - 7.7 thou/mm3    Lymphocytes Absolute 1.1 1.0 - 4.8 thou/mm3    Monocytes Absolute 0.6 0.4 - 1.3 thou/mm3    Eosinophils Absolute 0.0 0.0 - 0.4 thou/mm3    Basophils Absolute 0.0 0.0 - 0.1 thou/mm3    Immature Grans (Abs) 0.02 0.00 - 0.07 thou/mm3    nRBC 0 /100 wbc   Anion Gap    Collection Time: 11/06/20  4:33 AM   Result Value Ref Range    Anion Gap 12.0 8.0 - 16.0 meq/L   Glomerular Filtration Rate, Estimated    Collection Time: 11/06/20  4:33 AM   Result Value Ref Range    Est, Glom Filt Rate >90 ml/min/1.73m2   Magnesium    Collection Time: 11/06/20  4:33 AM   Result Value Ref Range    Magnesium 1.8 1.6 - 2.4 mg/dL        Microbiology:    Blood culture #1:   Lab Results   Component Value Date    BC No growth-preliminary  11/03/2020    BC No growth-preliminary  11/03/2020       Blood culture #2:No results found for: Laith Kitchen    Organism:  No results found for: LABGRAM    MRSA culture only:No results found for: [facility]    Urine culture: No results found for: LABURIN  No results found for: ORG     Respiratory culture: No results found for: CULTRESP    Aerobic and Anaerobic :  No results found for: LABAERO  No results found for: LABANAE    Urinalysis:    No results found for: Delorse Lemming, BACTERIA, RBCUA, [place], [address]anna [place] [address]    Radiology:-  Xr Chest Portable    Result Date: 11/3/2020  PROCEDURE: XR CHEST PORTABLE CLINICAL INFORMATION: sob; COVID +. COMPARISON: February 17, 2012 TECHNIQUE: Portable chest. FINDINGS: No discrete lobar consolidation. Costophrenic angles are preserved. Cardiomediastinal silhouette is within normal limits. Ectatic thoracic aorta. No acute osseous findings. No acute findings **This report has been created using voice recognition software. It may contain minor errors which are inherent in voice recognition technology. ** Final report electronically signed by Dr. Arpita Torrez on 11/3/2020 7:46 PM       Follow-up scheduled after discharge :-    today with Lelo Dai MD  in the next few days     Consultations during this hospital stay:-  [] NONE [] Cardiology  [] Nephrology  [] Hemo onco   [] GI   [] ID  [] Endocrine  [] Pulm    [] Neuro    [] Psych   [] Urology  [] ENT   [] G SURGERY   []Ortho    []CV surg    [] Palliative  [] Hospice [] Pain management   []    []TCU   [] PT/OT  OTHERS:-    Disposition: home  Condition at Discharge: Stable    Discharge Medications:      Caceres Gloss   Home Medication Instructions P:[id number]    Printed on:11/06/20 1447   Medication Information                      albuterol (PROAIR HFA) 108 (90 BASE) MCG/ACT inhaler  Inhale 2 puffs into the lungs every 6 hours as needed. aspirin 81 MG chewable tablet  Take 1 tablet by mouth daily             atenolol (TENORMIN) 50 MG tablet  Take 50 mg by mouth daily. azithromycin (ZITHROMAX) 250 MG tablet  Take 1 tablet by mouth daily for 3 days             Cholecalciferol (VITAMIN D) 25 MCG TABS  Take 1 tablet by mouth daily             dexamethasone (DECADRON) 6 MG tablet  Take 1 tablet by mouth daily for 2 days             fluticasone (FLONASE) 50 MCG/ACT nasal spray  2 sprays by Each Nostril route 2 times daily             hydrochlorothiazide (HYDRODIURIL) 12.5 MG tablet  Take 12.5 mg by mouth daily. ibuprofen (ADVIL;MOTRIN) 200 MG tablet  Take 200 mg by mouth every 6 hours as needed for Pain             mometasone-formoterol (DULERA) 100-5 MCG/ACT inhaler  Inhale 2 puffs into the lungs 2 times daily             montelukast (SINGULAIR) 10 MG tablet  Take 10 mg by mouth nightly             Multiple Vitamins-Minerals (CENTRUM SILVER) TABS  Take  by mouth daily. potassium chloride (KLOR-CON M) 20 MEQ extended release tablet  Take 1 tablet by mouth daily             rosuvastatin (CRESTOR) 10 MG tablet  Take 10 mg by mouth daily.                Triamcinolone Acetonide (NASACORT AQ NA)  by Nasal route as needed             zinc sulfate (ZINCATE) 220 (50 Zn) MG capsule  Take 1 capsule by mouth daily                     Time Spent:- 35 minutes    Electronically signed by Ab Rendon MD on 11/6/2020 at 2:43 PM  Discharging Hospitalist

## 2020-11-06 NOTE — PROGRESS NOTES
Jared Martin called and updated on the patient needing a home O2 eval. Dr. Angel Santiago would like O2 eval done so he can discharge the patient later on today.

## 2020-11-06 NOTE — DISCHARGE INSTR - ACTIVITY
Up as tolerated. Rest when tired. Get up and walk at least every hour or two to keep circulation moving to help prevent blood clots.

## 2020-11-06 NOTE — PROGRESS NOTES
Physician Progress Note      PATIENT:               Reji Gandhi  CSN #:                  [id number]  :                       1950  ADMIT DATE:       11/3/2020 6:15 PM  [address] [place] DATE:  RESPONDING  PROVIDER #:        Ronaldo Cox MD          QUERY TEXT:    Patient admitted with Covid 19. Noted documentation of acute respiratory   failure in H&P and PN . Please indicate one of the following and document   in the medical record: The medical record reflects the following:    Risk Factors: Covid 19  Clinical Indicators: dyspnea with exertion, no pulse ox <90% documented per   flowsheet, RR up to 24  Treatment: briefly on 2L NC, Decadron, Remdesivir    Thank you! Quin Paz, CRCR  RN Clinical   P: [phone]      Acute Respiratory Failure Clinical Indicators per 3[facility]-[employer] Training Guide and   Quick Reference Guide:  pO2 < 60 mmHg or SpO2 (pulse oximetry) < 91% breathing room air  pCO2 > 50 and pH < 7.35  P/F ratio (pO2 / FIO2) < 300  pO2 decrease or pCO2 increase by 10 mmHg from baseline (if known)  Supplemental oxygen of 40% or more  Presence of respiratory distress, tachypnea, dyspnea, shortness of breath,   wheezing  Unable to speak in complete sentences  Use of accessory muscles to breathe  Extreme anxiety and feeling of impending doom  Tripod position  Confusion/altered mental status/obtunded  Options provided:  -- Acute Respiratory Failure currently as evidenced by, Please document   evidence.   -- Currently resolved Acute Respiratory Failure was evidenced by, Please   document evidence  -- Acute Respiratory Failure ruled out after study  -- Other - I will add my own diagnosis  -- Disagree - Not applicable / Not valid  -- Disagree - Clinically unable to determine / Unknown  -- Refer to Clinical Documentation Reviewer    PROVIDER RESPONSE TEXT:    Acute hypoxic respiratory failure likely secondary to COVID-19 virus infection    Query created by: Jessi Bey

## 2020-11-06 NOTE — PROGRESS NOTES
A home oxygen evaluation has been completed. [x]Patient is an inpatient. It is expected that the patient will be discharged within the next 48 hours. Qualified provider to write orders for possible sleep study or home oxygen prescription. Social service/care managers will arrange for home oxygen if ordered. If patient is active, arrange for Home Medical supplier to assess for Oxygen Conserving Device per pulse oximetry. []Patient is an outpatient. Results will be faxed to the ordering provider. Qualified provider to write orders for possible sleep study or home oxygen prescription. Patient was placed on room air for 60 minutes. SpO2 was 95 % on room air at rest. Patients SpO2 was 89% or above and did not qualify for home oxygen. Patient was walked for 6 minutes. SpO2 was 95 % during walking. Patients SpO2 was 89% or above and did not qualify for home oxygen. Patient does not have a positive pressure airway device at home. Patient is not  diagnosed with Obstructive Sleep Apnea. Patient will not be set up/instructed on a nocturnal study. Results will be given to qualified provider. Checked with patients RN no nocturnal wanted    Note: For any SpO2 at 64% see policy and procedure for possible qualifications.

## 2020-11-06 NOTE — CARE COORDINATION
11/6/20, 2:56 PM EST    Patient goals/plan/ treatment preferences discussed by  and . Patient goals/plan/ treatment preferences reviewed with patient/ family. Patient/ family verbalize understanding of discharge plan and are in agreement with goal/plan/treatment preferences. Understanding was demonstrated using the teach back method. AVS provided by RN at time of discharge, which includes all necessary medical information pertaining to the patients current course of illness, treatment, post-discharge goals of care, and treatment preferences.         IMM Letter  IMM Letter given to Patient/Family/Significant other/Guardian/POA/by[de-identified]   IMM Letter date given[de-identified] 11/06/20  IMM Letter time given[de-identified] 8302

## 2020-11-07 ENCOUNTER — CARE COORDINATION (OUTPATIENT)
Dept: CASE MANAGEMENT | Age: 70
End: 2020-11-07

## 2020-11-07 NOTE — CARE COORDINATION
Patient contacted regarding [facility] diagnosis\". Discussed COVID-19 related testing which was available at this time. Test results were positive. Patient informed of results, if available? Yes    Care Transition Nurse/ Ambulatory Care Manager contacted the patient by telephone to perform post discharge assessment. Call within 2 business days of discharge: Yes. Verified name and  with patient as identifiers. Provided introduction to self, and explanation of the CTN/ACM role, and reason for call due to risk factors for infection and/or exposure to COVID-19. Symptoms reviewed with patient who verbalized the following symptoms: cough. Due to no new or worsening symptoms encounter was not routed to provider for escalation. Discussed follow-up appointments. If no appointment was previously scheduled, appointment scheduling offered: Yes  [facility] follow up appointment(s): No future appointments. Non-[facility] follow up appointment(s): patient will call her PCP and make an appt    Non-face-to-face services provided:  Obtained and reviewed discharge summary and/or continuity of care documents     Advance Care Planning:   Does patient have an Advance Directive:  reviewed and current. Patient has following risk factors of: acute respiratory failure. CTN/ACM reviewed discharge instructions, medical action plan and red flags such as increased shortness of breath, increasing fever and signs of decompensation with patient who verbalized understanding. Discussed exposure protocols and quarantine with CDC Guidelines What to do if you are sick with coronavirus disease .  Patient was given an opportunity for questions and concerns. The patient agrees to contact the [facility] exposure line [phone], local health department [place] Department of Health: ([phone]) and PCP office for questions related to their healthcare. CTN/ACM provided contact information for future needs.     Reviewed and educated patient on any new and changed medications related to discharge diagnosis     Patient/family/caregiver given information for [facility] and agrees to enroll no      Plan for follow-up call in 5-7 days based on severity of symptoms and risk factors. Patient stated she is doing well. She had done some chores this am and now she is ready for a nap. She has a slight cough and denies SOB. Denies concerns or issue at this time. She will call Monday to PCP to make a f/u appt.

## 2020-11-09 LAB
BLOOD CULTURE, ROUTINE: NORMAL
BLOOD CULTURE, ROUTINE: NORMAL

## 2020-11-13 ENCOUNTER — CARE COORDINATION (OUTPATIENT)
Dept: CASE MANAGEMENT | Age: 70
End: 2020-11-13

## 2020-11-13 NOTE — CARE COORDINATION
[facility] Transitions Follow Up Call    2020    Patient: Yadi Foote  Patient : 1950   MRN: [id number]  Reason for Admission: covid  Discharge Date: 20 RARS: Readmission Risk Score: 10           Needs to be reviewed by the provider   Additional needs identified to be addressed with provider No  none  Discussed COVID-19 related testing which was available at this time. Test results were positive. Patient informed of results, if available? Yes         Method of communication with provider : none    Care Transition Nurse (CTN) contacted the patient by telephone to follow up  . Verified name and  with patient as identifiers. Addressed changes since last contact:patient still has a slight cough but it is going away. She has an appt with PCP today. She denies concerns and issues. She states she is doing much better. Discharged needs reviewed: none  Follow up appointment completed? Yes    Advance Care Planning:   Does patient have an Advance Directive:  not on file. CTN reviewed discharge instructions, medical action plan and red flags with patient and discussed any barriers to care and/or understanding of plan of care after discharge. Discussed appropriate site of care based on symptoms and resources available to patient including: PCP. The patient agrees to contact the PCP office for questions related to their healthcare. Patients top risk factors for readmission: medical condition  Interventions to address risk factors: Obtained and reviewed discharge summary and/or continuity of care documents    CTN provided contact information for future needs. Care Transitions Subsequent and Final Call    Subsequent and Final Calls  Care Transitions Interventions  Other Interventions: Follow Up  No future appointments.     Kenneth Orellana RN

## 2021-03-04 ENCOUNTER — IMMUNIZATION (OUTPATIENT)
Dept: PRIMARY CARE CLINIC | Age: 71
End: 2021-03-04
Payer: MEDICARE

## 2021-03-04 PROCEDURE — 0001A COVID-19, PFIZER VACCINE 30MCG/0.3ML DOSE: CPT

## 2021-03-04 PROCEDURE — 91300 COVID-19, PFIZER VACCINE 30MCG/0.3ML DOSE: CPT

## 2021-03-25 ENCOUNTER — IMMUNIZATION (OUTPATIENT)
Dept: PRIMARY CARE CLINIC | Age: 71
End: 2021-03-25
Payer: MEDICARE

## 2021-03-25 PROCEDURE — 91300 COVID-19, PFIZER VACCINE 30MCG/0.3ML DOSE: CPT | Performed by: PHARMACIST

## 2021-03-25 PROCEDURE — 0002A COVID-19, PFIZER VACCINE 30MCG/0.3ML DOSE: CPT | Performed by: PHARMACIST

## 2021-09-02 ENCOUNTER — HOSPITAL ENCOUNTER (OUTPATIENT)
Age: 71
Discharge: HOME | End: 2021-09-02
Payer: MEDICARE

## 2021-09-02 PROCEDURE — 87636 SARSCOV2 & INF A&B AMP PRB: CPT

## 2021-09-03 LAB
INFLUENZA A: NOT DETECTED
INFLUENZA B: NOT DETECTED
SARS-COV-2 RNA, RT PCR: NOT DETECTED

## 2021-10-17 ENCOUNTER — HOSPITAL ENCOUNTER (EMERGENCY)
Age: 71
Discharge: HOME | End: 2021-10-17
Payer: MEDICARE

## 2021-10-17 VITALS
RESPIRATION RATE: 16 BRPM | HEART RATE: 78 BPM | TEMPERATURE: 97 F | OXYGEN SATURATION: 95 % | SYSTOLIC BLOOD PRESSURE: 183 MMHG | DIASTOLIC BLOOD PRESSURE: 95 MMHG

## 2021-10-17 DIAGNOSIS — Z11.52 ENCOUNTER FOR SCREENING FOR COVID-19: Primary | ICD-10-CM

## 2021-10-17 LAB
INFLUENZA A: NOT DETECTED
INFLUENZA B: NOT DETECTED
SARS-COV-2 RNA, RT PCR: NOT DETECTED

## 2021-10-17 PROCEDURE — 99213 OFFICE O/P EST LOW 20 MIN: CPT | Performed by: NURSE PRACTITIONER

## 2021-10-17 PROCEDURE — 87636 SARSCOV2 & INF A&B AMP PRB: CPT

## 2021-10-17 PROCEDURE — 99213 OFFICE O/P EST LOW 20 MIN: CPT

## 2021-10-17 ASSESSMENT — ENCOUNTER SYMPTOMS
NAUSEA: 0
VOMITING: 0
SHORTNESS OF BREATH: 0
COUGH: 0
DIARRHEA: 0
SORE THROAT: 0
CHEST TIGHTNESS: 0
RHINORRHEA: 0

## 2021-10-17 NOTE — ED PROVIDER NOTES
[facility]  Urgent Care Encounter       CHIEF COMPLAINT       Chief Complaint   Patient presents with    Covid Testing     PCR       Nurses Notes reviewed and I agree except as noted in the HPI. HISTORY OF PRESENT ILLNESS   Celio Bates is a 69 y.o. female who presents patient seen and evaluated requesting a PCR Covid test.  She reports that she is attempting to travel to [place] who is requiring a PCR test.  A test was collected with  and the patient tolerated. Patient denies any associated symptoms with Covid. The history is provided by the patient. No  was used. REVIEW OF SYSTEMS     Review of Systems   Constitutional: Negative for activity change, appetite change, chills, fatigue and fever. HENT: Negative for ear discharge, ear pain, rhinorrhea and sore throat. Respiratory: Negative for cough, chest tightness and shortness of breath. Cardiovascular: Negative for chest pain. Gastrointestinal: Negative for diarrhea, nausea and vomiting. Genitourinary: Negative for dysuria. Skin: Negative for rash. Allergic/Immunologic: Negative for environmental allergies and food allergies. Neurological: Negative for dizziness and headaches. PAST MEDICAL HISTORY         Diagnosis Date    Allergic rhinitis     Asthma     Hyperlipidemia     Hypertension     PONV (postoperative nausea and vomiting)     patch helps       SURGICALHISTORY     Patient  has a past surgical history that includes Cholecystectomy (10/20/2011); bladder suspension (1984); Hysterectomy (1984); Tonsillectomy and adenoidectomy; Adenoidectomy; Breast surgery (july 2010); Colonoscopy; and Upper gastrointestinal endoscopy. CURRENT MEDICATIONS       Previous Medications    ALBUTEROL (PROAIR HFA) 108 (90 BASE) MCG/ACT INHALER    Inhale 2 puffs into the lungs every 6 hours as needed.       ASPIRIN 81 MG CHEWABLE TABLET    Take 1 tablet by mouth daily    ATENOLOL (TENORMIN) 50 MG TABLET    Take 50 mg by mouth daily. CHOLECALCIFEROL (VITAMIN D) 25 MCG TABS    Take 1 tablet by mouth daily    FLUTICASONE (FLONASE) 50 MCG/ACT NASAL SPRAY    2 sprays by Each Nostril route 2 times daily    HYDROCHLOROTHIAZIDE (HYDRODIURIL) 12.5 MG TABLET    Take 12.5 mg by mouth daily. IBUPROFEN (ADVIL;MOTRIN) 200 MG TABLET    Take 200 mg by mouth every 6 hours as needed for Pain    MOMETASONE-FORMOTEROL (DULERA) 100-5 MCG/ACT INHALER    Inhale 2 puffs into the lungs 2 times daily    MONTELUKAST (SINGULAIR) 10 MG TABLET    Take 10 mg by mouth nightly    MULTIPLE VITAMINS-MINERALS (CENTRUM SILVER) TABS    Take  by mouth daily. POTASSIUM CHLORIDE (KLOR-CON M) 20 MEQ EXTENDED RELEASE TABLET    Take 1 tablet by mouth daily    ROSUVASTATIN (CRESTOR) 10 MG TABLET    Take 10 mg by mouth daily. TRIAMCINOLONE ACETONIDE (NASACORT AQ NA)    by Nasal route as needed    ZINC SULFATE (ZINCATE) 220 (50 ZN) MG CAPSULE    Take 1 capsule by mouth daily       ALLERGIES     Patient is has No Known Allergies. Patients   Immunization History   Administered Date(s) Administered    COVID-19, Pfizer, PF, 30mcg/0.3mL 03/04/2021, 03/25/2021    Tdap (Boostrix, Adacel) 12/19/2019       FAMILY HISTORY     Patient's family history includes Cancer in her brother and brother; Diabetes in her brother, brother, father, mother, and sister; Heart Disease in her brother; High Blood Pressure in her brother, brother, brother, father, and mother; Gentry Chock in her paternal grandfather. SOCIAL HISTORY     Patient  reports that she quit smoking about 20 years ago. She has never used smokeless tobacco. She reports that she does not drink alcohol and does not use drugs. PHYSICAL EXAM     ED TRIAGE VITALS  BP: (!) 183/95, Temp: 97 °F (36.1 °C), Pulse: 78, Resp: 16, SpO2: 95 %,Estimated body mass index is 33.32 kg/m² as calculated from the following:    Height as of 11/3/20: 4' 11.5\" (1.511 m).     Weight as of 11/6/20: 167 lb 12.8 oz (76.1 kg). ,No LMP recorded. Patient has had a hysterectomy. Physical Exam  Vitals and nursing note reviewed. Constitutional:       General: She is not in acute distress. Appearance: Normal appearance. She is not ill-appearing, toxic-appearing or diaphoretic. HENT:      Head: Normocephalic. Right Ear: Ear canal and external ear normal.      Left Ear: Ear canal and external ear normal.      Nose: Nose normal. No congestion or rhinorrhea. Mouth/Throat:      Mouth: Mucous membranes are moist.      Pharynx: Oropharynx is clear. No oropharyngeal exudate or posterior oropharyngeal erythema. Cardiovascular:      Rate and Rhythm: Normal rate. Pulses: Normal pulses. Pulmonary:      Effort: Pulmonary effort is normal. No respiratory distress. Breath sounds: No stridor. No wheezing or rhonchi. Abdominal:      General: Abdomen is flat. Bowel sounds are normal.      Palpations: Abdomen is soft. Musculoskeletal:         General: No swelling or tenderness. Normal range of motion. Cervical back: Normal range of motion. Neurological:      General: No focal deficit present. Mental Status: She is alert and oriented to person, place, and time. Psychiatric:         Mood and Affect: Mood normal.         Behavior: Behavior normal.         DIAGNOSTIC RESULTS     Labs:No results found for this visit on 10/17/21. IMAGING:    No orders to display         EKG: None      URGENT CARE COURSE:     Vitals:    10/17/21 1120   BP: (!) 183/95   Pulse: 78   Resp: 16   Temp: 97 °F (36.1 °C)   TempSrc: Tympanic   SpO2: 95%       Medications - No data to display         PROCEDURES:  None    FINAL IMPRESSION      1. Encounter for screening for COVID-19          DISPOSITION/ PLAN     Patient seen and evaluated for a Covid screen for travel. She is instructed how to obtain her results. She is instructed to follow-up with her PCP if needed.   She is agreeable with the above plan and denies questions or concerns at this time.       PATIENT REFERRED TO:  Paul Turner MD  [address] [facility] / [facility] [id number]      DISCHARGE MEDICATIONS:  New Prescriptions    No medications on file       Discontinued Medications    No medications on file       Current Discharge Medication List          IHSAN Bonilla CNP    (Please note that portions of this note were completed with a voice recognition program. Efforts were made to edit the dictations but occasionally words are mis-transcribed.)           IHSAN Bonilla CNP  10/17/21 1148

## 2023-04-24 ENCOUNTER — HOSPITAL ENCOUNTER (OUTPATIENT)
Dept: CT IMAGING | Age: 73
Discharge: HOME | End: 2023-04-24
Payer: COMMERCIAL

## 2023-04-24 DIAGNOSIS — S09.90XA TRAUMATIC INJURY OF HEAD, INITIAL ENCOUNTER: ICD-10-CM

## 2023-04-24 DIAGNOSIS — G44.52 NEW DAILY PERSISTENT HEADACHE: ICD-10-CM

## 2023-04-24 PROCEDURE — 70450 CT HEAD/BRAIN W/O DYE: CPT

## 2024-07-18 ENCOUNTER — HOSPITAL ENCOUNTER (EMERGENCY)
Age: 74
Discharge: HOME | End: 2024-07-18
Payer: MEDICARE

## 2024-07-18 ENCOUNTER — APPOINTMENT (OUTPATIENT)
Dept: GENERAL RADIOLOGY | Age: 74
End: 2024-07-18
Payer: MEDICARE

## 2024-07-18 VITALS
BODY MASS INDEX: 35.19 KG/M2 | SYSTOLIC BLOOD PRESSURE: 134 MMHG | WEIGHT: 177.2 LBS | HEART RATE: 82 BPM | TEMPERATURE: 98.7 F | RESPIRATION RATE: 20 BRPM | OXYGEN SATURATION: 92 % | DIASTOLIC BLOOD PRESSURE: 84 MMHG

## 2024-07-18 DIAGNOSIS — J18.9 PNEUMONIA OF RIGHT LOWER LOBE DUE TO INFECTIOUS ORGANISM: Primary | ICD-10-CM

## 2024-07-18 PROCEDURE — 6370000000 HC RX 637 (ALT 250 FOR IP)

## 2024-07-18 PROCEDURE — 99213 OFFICE O/P EST LOW 20 MIN: CPT

## 2024-07-18 PROCEDURE — 94640 AIRWAY INHALATION TREATMENT: CPT

## 2024-07-18 PROCEDURE — 71046 X-RAY EXAM CHEST 2 VIEWS: CPT

## 2024-07-18 RX ORDER — DOXYCYCLINE HYCLATE 100 MG
100 TABLET ORAL 2 TIMES DAILY
Qty: 10 TABLET | Refills: 0 | Status: SHIPPED | OUTPATIENT
Start: 2024-07-18 | End: 2024-07-23

## 2024-07-18 RX ORDER — IPRATROPIUM BROMIDE AND ALBUTEROL SULFATE 2.5; .5 MG/3ML; MG/3ML
1 SOLUTION RESPIRATORY (INHALATION) ONCE
Status: COMPLETED | OUTPATIENT
Start: 2024-07-18 | End: 2024-07-18

## 2024-07-18 RX ORDER — GLIPIZIDE 2.5 MG/1
2.5 TABLET, EXTENDED RELEASE ORAL DAILY
COMMUNITY

## 2024-07-18 RX ORDER — METFORMIN HYDROCHLORIDE 500 MG/1
500 TABLET, EXTENDED RELEASE ORAL 2 TIMES DAILY
COMMUNITY
Start: 2024-06-03

## 2024-07-18 RX ORDER — AMOXICILLIN AND CLAVULANATE POTASSIUM 875; 125 MG/1; MG/1
1 TABLET, FILM COATED ORAL 2 TIMES DAILY
Qty: 10 TABLET | Refills: 0 | Status: SHIPPED | OUTPATIENT
Start: 2024-07-18 | End: 2024-07-23

## 2024-07-18 RX ORDER — CALCIUM CARBONATE 500(1250)
500 TABLET ORAL DAILY
COMMUNITY

## 2024-07-18 RX ORDER — PREDNISONE 20 MG/1
40 TABLET ORAL 2 TIMES DAILY
Qty: 20 TABLET | Refills: 0 | Status: SHIPPED | OUTPATIENT
Start: 2024-07-18 | End: 2024-07-23

## 2024-07-18 RX ADMIN — IPRATROPIUM BROMIDE AND ALBUTEROL SULFATE 1 DOSE: .5; 3 SOLUTION RESPIRATORY (INHALATION) at 15:25

## 2024-07-18 ASSESSMENT — PAIN - FUNCTIONAL ASSESSMENT
PAIN_FUNCTIONAL_ASSESSMENT: 0-10
PAIN_FUNCTIONAL_ASSESSMENT: ACTIVITIES ARE NOT PREVENTED

## 2024-07-18 ASSESSMENT — ENCOUNTER SYMPTOMS
WHEEZING: 1
ALLERGIC/IMMUNOLOGIC NEGATIVE: 1
GASTROINTESTINAL NEGATIVE: 1
COUGH: 1
EYES NEGATIVE: 1

## 2024-07-18 ASSESSMENT — PAIN DESCRIPTION - ORIENTATION: ORIENTATION: RIGHT

## 2024-07-18 ASSESSMENT — PAIN SCALES - GENERAL: PAINLEVEL_OUTOF10: 7

## 2024-07-18 ASSESSMENT — PAIN DESCRIPTION - LOCATION: LOCATION: EAR

## 2024-07-18 ASSESSMENT — PAIN DESCRIPTION - FREQUENCY: FREQUENCY: INTERMITTENT

## 2024-07-18 NOTE — ED TRIAGE NOTES
Amy arrives to room with complaint of  cough, wheezing, chest congestion, right ear feels like it is \"burning\",  symptoms started 2 1/2 weeks   ago.       States she took Azithromycin for 3 days from her PCP.  States she feels some better, but still with a lot of coughing and wheezing.

## 2024-07-18 NOTE — DISCHARGE INSTRUCTIONS
Medications as prescribed.  Increase fluid intake.  Diet as tolerated.  Follow-up with family doctor in 3 days.  Go to emergency room for any increased shortness of breath wheezing or any new or worsening symptoms.

## 2024-07-18 NOTE — ED PROVIDER NOTES
[facility] URGENT CARE  Urgent Care Encounter       CHIEF COMPLAINT       Chief Complaint   Patient presents with    Cough       Nurses Notes reviewed and I agree except as noted in the HPI.  HISTORY OF PRESENT ILLNESS   Amy Hdz is a 73 y.o. female who presents to urgent care for cough and wheezing.  Symptoms started 2-1/2 weeks ago.  States she got a 3-day prescription for azithromycin with no relief.    The history is provided by the patient. No  was used.       REVIEW OF SYSTEMS     Review of Systems   Constitutional: Negative.    HENT: Negative.     Eyes: Negative.    Respiratory:  Positive for cough and wheezing.    Gastrointestinal: Negative.    Endocrine: Negative.    Genitourinary: Negative.    Musculoskeletal: Negative.    Skin: Negative.    Allergic/Immunologic: Negative.    Neurological: Negative.    Hematological: Negative.    Psychiatric/Behavioral: Negative.         PAST MEDICAL HISTORY         Diagnosis Date    Allergic rhinitis     Asthma     Hyperlipidemia     Hypertension     PONV (postoperative nausea and vomiting)     patch helps       SURGICALHISTORY     Patient  has a past surgical history that includes Cholecystectomy (10/20/2011); bladder suspension (1984); Hysterectomy (1984); Tonsillectomy and adenoidectomy; Adenoidectomy; Breast surgery (july 2010); Colonoscopy; and Upper gastrointestinal endoscopy.    CURRENT MEDICATIONS       Discharge Medication List as of 7/18/2024  4:17 PM        CONTINUE these medications which have NOT CHANGED    Details   calcium carbonate (OSCAL) 500 MG TABS tablet Take 1 tablet by mouth dailyHistorical Med      metFORMIN (GLUCOPHAGE-XR) 500 MG extended release tablet 1 tablet in the morning and at bedtimeHistorical Med      glipiZIDE (GLUCOTROL XL) 2.5 MG extended release tablet Take 1 tablet by mouth dailyHistorical Med      fluticasone (FLONASE) 50 MCG/ACT nasal spray 2 sprays by Each Nostril route 2 times daily, Disp-1  extended release tablet Comments:   Reason for Stopping:         mometasone-formoterol (DULERA) 100-5 MCG/ACT inhaler Comments:   Reason for Stopping:               Discharge Medication List as of 7/18/2024  4:17 PM          IHSAN Bates CNP    (Please note that portions of this note were completed with a voice recognition program. Efforts were made to edit the dictations but occasionally words are mis-transcribed.)            Betty Saavedra APRN - CNP  07/18/24 0078

## 2024-09-03 ENCOUNTER — HOSPITAL ENCOUNTER (OUTPATIENT)
Dept: GENERAL RADIOLOGY | Age: 74
Discharge: HOME | End: 2024-09-03
Payer: MEDICARE

## 2024-09-03 ENCOUNTER — HOSPITAL ENCOUNTER (OUTPATIENT)
Age: 74
Discharge: HOME | End: 2024-09-03
Payer: MEDICARE

## 2024-09-03 DIAGNOSIS — R05.3 PERSISTENT COUGH: ICD-10-CM

## 2024-09-03 PROCEDURE — 71046 X-RAY EXAM CHEST 2 VIEWS: CPT

## 2025-01-03 ENCOUNTER — HOSPITAL ENCOUNTER (OUTPATIENT)
Dept: CT IMAGING | Age: 75
Discharge: HOME | End: 2025-01-03
Attending: INTERNAL MEDICINE
Payer: MEDICARE

## 2025-01-03 DIAGNOSIS — R10.11 RIGHT UPPER QUADRANT PAIN: ICD-10-CM

## 2025-01-03 LAB — POC CREATININE WHOLE BLOOD: 0.8 MG/DL (ref 0.5–1.2)

## 2025-01-03 PROCEDURE — 74177 CT ABD & PELVIS W/CONTRAST: CPT

## 2025-01-03 PROCEDURE — 82565 ASSAY OF CREATININE: CPT

## 2025-01-03 PROCEDURE — 6360000004 HC RX CONTRAST MEDICATION: Performed by: INTERNAL MEDICINE

## 2025-01-03 RX ORDER — IOPAMIDOL 755 MG/ML
80 INJECTION, SOLUTION INTRAVASCULAR
Status: COMPLETED | OUTPATIENT
Start: 2025-01-03 | End: 2025-01-03

## 2025-01-03 RX ADMIN — IOPAMIDOL 80 ML: 755 INJECTION, SOLUTION INTRAVENOUS at 07:23

## 2025-01-21 ENCOUNTER — TRANSCRIBE ORDERS (OUTPATIENT)
Dept: ADMINISTRATIVE | Age: 75
End: 2025-01-21

## 2025-01-21 DIAGNOSIS — K76.0 FATTY LIVER: Primary | ICD-10-CM

## 2025-06-27 ENCOUNTER — HOSPITAL ENCOUNTER (OUTPATIENT)
Dept: ULTRASOUND IMAGING | Age: 75
Discharge: HOME | End: 2025-06-27
Payer: MEDICARE

## 2025-06-27 DIAGNOSIS — K76.0 FATTY LIVER: ICD-10-CM

## 2025-06-27 PROCEDURE — 76705 ECHO EXAM OF ABDOMEN: CPT
